# Patient Record
Sex: FEMALE | Race: WHITE | Employment: UNEMPLOYED | URBAN - METROPOLITAN AREA
[De-identification: names, ages, dates, MRNs, and addresses within clinical notes are randomized per-mention and may not be internally consistent; named-entity substitution may affect disease eponyms.]

---

## 2018-03-25 ENCOUNTER — HOSPITAL ENCOUNTER (EMERGENCY)
Age: 53
Discharge: HOME | End: 2018-03-25
Attending: EMERGENCY MEDICINE
Payer: MEDICARE

## 2018-03-25 VITALS
DIASTOLIC BLOOD PRESSURE: 67 MMHG | SYSTOLIC BLOOD PRESSURE: 104 MMHG | TEMPERATURE: 98.6 F | RESPIRATION RATE: 18 BRPM | HEART RATE: 80 BPM | WEIGHT: 240 LBS | HEIGHT: 64 IN | OXYGEN SATURATION: 92 % | BODY MASS INDEX: 40.97 KG/M2

## 2018-03-25 DIAGNOSIS — G40.909 SEIZURE DISORDER (HCC): Primary | ICD-10-CM

## 2018-03-25 DIAGNOSIS — S01.511A LIP LACERATION, INITIAL ENCOUNTER: ICD-10-CM

## 2018-03-25 LAB
EKG ATRIAL RATE: 86 BPM
EKG P AXIS: 49 DEGREES
EKG P-R INTERVAL: 180 MS
EKG Q-T INTERVAL: 378 MS
EKG QRS DURATION: 128 MS
EKG QTC CALCULATION (BAZETT): 452 MS
EKG R AXIS: -14 DEGREES
EKG T AXIS: 11 DEGREES
EKG VENTRICULAR RATE: 86 BPM

## 2018-03-25 PROCEDURE — 93005 ELECTROCARDIOGRAM TRACING: CPT | Performed by: EMERGENCY MEDICINE

## 2018-03-25 PROCEDURE — 99284 EMERGENCY DEPT VISIT MOD MDM: CPT

## 2018-03-25 RX ORDER — ZOLPIDEM TARTRATE 5 MG/1
5 TABLET ORAL NIGHTLY PRN
COMMUNITY
End: 2022-02-15

## 2018-03-25 RX ORDER — QUETIAPINE FUMARATE 200 MG/1
300 TABLET, FILM COATED ORAL 2 TIMES DAILY
COMMUNITY

## 2018-03-25 ASSESSMENT — ENCOUNTER SYMPTOMS
SHORTNESS OF BREATH: 0
BACK PAIN: 0
CHEST TIGHTNESS: 0
SORE THROAT: 0
DIARRHEA: 0
ABDOMINAL PAIN: 0
VOMITING: 0
COUGH: 0
NAUSEA: 0

## 2018-03-25 NOTE — ED PROVIDER NOTES
discharge with outpatient follow-up. The plan has been discussed in detail and they are aware of the specific conditions for emergent return, as well as the importance of follow-up. Discharge Medication List as of 3/25/2018 12:14 PM          Diagnosis:  1. Seizure disorder ([facility] [address]    2. Lip laceration, initial encounter        Disposition:  Patient's disposition: Discharge to home  Patient's condition is stable.        Bibi Martinez, DO  Resident  03/25/18 [id number]

## 2018-05-15 ENCOUNTER — HOSPITAL ENCOUNTER (EMERGENCY)
Age: 53
Discharge: HOME | End: 2018-05-15
Attending: EMERGENCY MEDICINE
Payer: MEDICARE

## 2018-05-15 VITALS
HEIGHT: 62 IN | BODY MASS INDEX: 39.01 KG/M2 | TEMPERATURE: 97.4 F | RESPIRATION RATE: 18 BRPM | HEART RATE: 98 BPM | SYSTOLIC BLOOD PRESSURE: 102 MMHG | OXYGEN SATURATION: 97 % | WEIGHT: 212 LBS | DIASTOLIC BLOOD PRESSURE: 69 MMHG

## 2018-05-15 DIAGNOSIS — S01.01XA LACERATION OF SCALP, INITIAL ENCOUNTER: Primary | ICD-10-CM

## 2018-05-15 PROCEDURE — 6360000002 HC RX W HCPCS: Performed by: EMERGENCY MEDICINE

## 2018-05-15 PROCEDURE — 12001 RPR S/N/AX/GEN/TRNK 2.5CM/<: CPT

## 2018-05-15 PROCEDURE — 99283 EMERGENCY DEPT VISIT LOW MDM: CPT

## 2018-05-15 PROCEDURE — 90715 TDAP VACCINE 7 YRS/> IM: CPT | Performed by: EMERGENCY MEDICINE

## 2018-05-15 PROCEDURE — 90471 IMMUNIZATION ADMIN: CPT | Performed by: EMERGENCY MEDICINE

## 2018-05-15 RX ADMIN — TETANUS TOXOID, REDUCED DIPHTHERIA TOXOID AND ACELLULAR PERTUSSIS VACCINE, ADSORBED 0.5 ML: 5; 2.5; 8; 8; 2.5 SUSPENSION INTRAMUSCULAR at 09:10

## 2018-05-15 ASSESSMENT — ENCOUNTER SYMPTOMS
NAUSEA: 0
BLOOD IN STOOL: 0
ABDOMINAL PAIN: 0
COUGH: 0
VOMITING: 0
BACK PAIN: 0
SHORTNESS OF BREATH: 0

## 2018-05-15 ASSESSMENT — PAIN DESCRIPTION - ONSET: ONSET: SUDDEN

## 2018-05-15 ASSESSMENT — PAIN DESCRIPTION - ORIENTATION: ORIENTATION: POSTERIOR

## 2018-05-15 ASSESSMENT — PAIN SCALES - GENERAL: PAINLEVEL_OUTOF10: 7

## 2018-05-15 ASSESSMENT — PAIN DESCRIPTION - FREQUENCY: FREQUENCY: CONTINUOUS

## 2018-05-15 ASSESSMENT — PAIN DESCRIPTION - LOCATION: LOCATION: HEAD

## 2018-05-15 ASSESSMENT — PAIN DESCRIPTION - PAIN TYPE: TYPE: ACUTE PAIN

## 2018-05-15 ASSESSMENT — PAIN SCALES - WONG BAKER: WONGBAKER_NUMERICALRESPONSE: 6

## 2018-05-15 ASSESSMENT — PAIN DESCRIPTION - DESCRIPTORS: DESCRIPTORS: SORE

## 2018-05-22 ENCOUNTER — HOSPITAL ENCOUNTER (EMERGENCY)
Age: 53
Discharge: HOME | End: 2018-05-22
Payer: MEDICARE

## 2018-05-22 VITALS
SYSTOLIC BLOOD PRESSURE: 96 MMHG | DIASTOLIC BLOOD PRESSURE: 62 MMHG | WEIGHT: 210 LBS | HEART RATE: 95 BPM | OXYGEN SATURATION: 97 % | TEMPERATURE: 98.3 F | BODY MASS INDEX: 37.21 KG/M2 | RESPIRATION RATE: 16 BRPM | HEIGHT: 63 IN

## 2018-05-22 DIAGNOSIS — Z48.02 ENCOUNTER FOR STAPLE REMOVAL: Primary | ICD-10-CM

## 2018-05-22 PROCEDURE — 99281 EMR DPT VST MAYX REQ PHY/QHP: CPT

## 2018-06-24 ENCOUNTER — HOSPITAL ENCOUNTER (EMERGENCY)
Age: 53
Discharge: HOME | End: 2018-06-24
Attending: EMERGENCY MEDICINE
Payer: MEDICARE

## 2018-06-24 ENCOUNTER — APPOINTMENT (OUTPATIENT)
Dept: GENERAL RADIOLOGY | Age: 53
End: 2018-06-24
Payer: MEDICARE

## 2018-06-24 VITALS
BODY MASS INDEX: 42.33 KG/M2 | DIASTOLIC BLOOD PRESSURE: 64 MMHG | HEART RATE: 86 BPM | SYSTOLIC BLOOD PRESSURE: 127 MMHG | RESPIRATION RATE: 14 BRPM | HEIGHT: 62 IN | OXYGEN SATURATION: 94 % | WEIGHT: 230 LBS | TEMPERATURE: 97.8 F

## 2018-06-24 DIAGNOSIS — R56.9 SEIZURE (HCC): Primary | ICD-10-CM

## 2018-06-24 LAB
ALBUMIN SERPL-MCNC: 3.8 G/DL (ref 3.5–5.2)
ALP BLD-CCNC: 75 U/L (ref 35–104)
ALT SERPL-CCNC: 17 U/L (ref 0–32)
ANION GAP SERPL CALCULATED.3IONS-SCNC: 15 MMOL/L (ref 7–16)
AST SERPL-CCNC: 17 U/L (ref 0–31)
BASOPHILS ABSOLUTE: 0.04 E9/L (ref 0–0.2)
BASOPHILS RELATIVE PERCENT: 0.4 % (ref 0–2)
BILIRUB SERPL-MCNC: <0.2 MG/DL (ref 0–1.2)
BILIRUBIN URINE: NEGATIVE
BLOOD, URINE: NEGATIVE
BUN BLDV-MCNC: 15 MG/DL (ref 6–20)
CALCIUM SERPL-MCNC: 9.1 MG/DL (ref 8.6–10.2)
CHLORIDE BLD-SCNC: 101 MMOL/L (ref 98–107)
CHP ED QC CHECK: NORMAL
CLARITY: CLEAR
CO2: 24 MMOL/L (ref 22–29)
COLOR: NORMAL
CREAT SERPL-MCNC: 0.6 MG/DL (ref 0.5–1)
EOSINOPHILS ABSOLUTE: 0.13 E9/L (ref 0.05–0.5)
EOSINOPHILS RELATIVE PERCENT: 1.4 % (ref 0–6)
GFR AFRICAN AMERICAN: >60
GFR NON-AFRICAN AMERICAN: >60 ML/MIN/1.73
GLUCOSE BLD-MCNC: 91 MG/DL
GLUCOSE BLD-MCNC: 94 MG/DL (ref 74–109)
GLUCOSE URINE: NEGATIVE MG/DL
HCT VFR BLD CALC: 39.8 % (ref 34–48)
HEMOGLOBIN: 12.9 G/DL (ref 11.5–15.5)
IMMATURE GRANULOCYTES #: 0.13 E9/L
IMMATURE GRANULOCYTES %: 1.4 % (ref 0–5)
KETONES, URINE: NEGATIVE MG/DL
LACTIC ACID: 2.3 MMOL/L (ref 0.5–2.2)
LEUKOCYTE ESTERASE, URINE: NEGATIVE
LIPASE: 57 U/L (ref 13–60)
LYMPHOCYTES ABSOLUTE: 3.69 E9/L (ref 1.5–4)
LYMPHOCYTES RELATIVE PERCENT: 38.7 % (ref 20–42)
MCH RBC QN AUTO: 31.5 PG (ref 26–35)
MCHC RBC AUTO-ENTMCNC: 32.4 % (ref 32–34.5)
MCV RBC AUTO: 97.1 FL (ref 80–99.9)
METER GLUCOSE: 91 MG/DL (ref 70–110)
MONOCYTES ABSOLUTE: 0.69 E9/L (ref 0.1–0.95)
MONOCYTES RELATIVE PERCENT: 7.2 % (ref 2–12)
NEUTROPHILS ABSOLUTE: 4.85 E9/L (ref 1.8–7.3)
NEUTROPHILS RELATIVE PERCENT: 50.9 % (ref 43–80)
NITRITE, URINE: NEGATIVE
PDW BLD-RTO: 14.9 FL (ref 11.5–15)
PH UA: 7 (ref 5–9)
PLATELET # BLD: 254 E9/L (ref 130–450)
PMV BLD AUTO: 9.7 FL (ref 7–12)
POTASSIUM SERPL-SCNC: 4.7 MMOL/L (ref 3.5–5)
PROTEIN UA: NEGATIVE MG/DL
RBC # BLD: 4.1 E12/L (ref 3.5–5.5)
SODIUM BLD-SCNC: 140 MMOL/L (ref 132–146)
SPECIFIC GRAVITY UA: 1.01 (ref 1–1.03)
TOTAL PROTEIN: 7.2 G/DL (ref 6.4–8.3)
UROBILINOGEN, URINE: 0.2 E.U./DL
VALPROIC ACID LEVEL: 41 MCG/ML (ref 50–100)
WBC # BLD: 9.5 E9/L (ref 4.5–11.5)

## 2018-06-24 PROCEDURE — 81003 URINALYSIS AUTO W/O SCOPE: CPT

## 2018-06-24 PROCEDURE — 80053 COMPREHEN METABOLIC PANEL: CPT

## 2018-06-24 PROCEDURE — 83605 ASSAY OF LACTIC ACID: CPT

## 2018-06-24 PROCEDURE — 71046 X-RAY EXAM CHEST 2 VIEWS: CPT

## 2018-06-24 PROCEDURE — 80164 ASSAY DIPROPYLACETIC ACD TOT: CPT

## 2018-06-24 PROCEDURE — 83690 ASSAY OF LIPASE: CPT

## 2018-06-24 PROCEDURE — 99285 EMERGENCY DEPT VISIT HI MDM: CPT

## 2018-06-24 PROCEDURE — 82962 GLUCOSE BLOOD TEST: CPT

## 2018-06-24 PROCEDURE — 85025 COMPLETE CBC W/AUTO DIFF WBC: CPT

## 2018-06-24 RX ORDER — VALPROIC ACID 250 MG/1
250 CAPSULE, LIQUID FILLED ORAL ONCE
Status: DISCONTINUED | OUTPATIENT
Start: 2018-06-24 | End: 2018-06-24

## 2018-06-24 ASSESSMENT — ENCOUNTER SYMPTOMS
CHEST TIGHTNESS: 0
WHEEZING: 0
ABDOMINAL PAIN: 0
BACK PAIN: 0
VOMITING: 0
NAUSEA: 0
CONSTIPATION: 0
COUGH: 0
DIARRHEA: 0
SHORTNESS OF BREATH: 0
RHINORRHEA: 0
SORE THROAT: 0

## 2018-06-25 ENCOUNTER — APPOINTMENT (OUTPATIENT)
Dept: CT IMAGING | Age: 53
End: 2018-06-25
Payer: MEDICARE

## 2018-06-25 ENCOUNTER — APPOINTMENT (OUTPATIENT)
Dept: GENERAL RADIOLOGY | Age: 53
End: 2018-06-25
Payer: MEDICARE

## 2018-06-25 ENCOUNTER — HOSPITAL ENCOUNTER (EMERGENCY)
Age: 53
Discharge: HOME | End: 2018-06-25
Attending: EMERGENCY MEDICINE
Payer: MEDICARE

## 2018-06-25 VITALS
DIASTOLIC BLOOD PRESSURE: 63 MMHG | HEART RATE: 86 BPM | OXYGEN SATURATION: 92 % | RESPIRATION RATE: 16 BRPM | WEIGHT: 230 LBS | BODY MASS INDEX: 39.27 KG/M2 | SYSTOLIC BLOOD PRESSURE: 113 MMHG | HEIGHT: 64 IN | TEMPERATURE: 97.4 F

## 2018-06-25 DIAGNOSIS — Z87.898 HISTORY OF SEIZURE: ICD-10-CM
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
## 2018-06-25 DIAGNOSIS — S00.83XA FACIAL CONTUSION, INITIAL ENCOUNTER: ICD-10-CM

## 2018-06-25 LAB
ALBUMIN SERPL-MCNC: 3.8 G/DL (ref 3.5–5.2)
ALP BLD-CCNC: 76 U/L (ref 35–104)
ALT SERPL-CCNC: 19 U/L (ref 0–32)
ANION GAP SERPL CALCULATED.3IONS-SCNC: 14 MMOL/L (ref 7–16)
AST SERPL-CCNC: 21 U/L (ref 0–31)
BACTERIA: ABNORMAL /HPF
BASOPHILS ABSOLUTE: 0.03 E9/L (ref 0–0.2)
BASOPHILS RELATIVE PERCENT: 0.4 % (ref 0–2)
BILIRUB SERPL-MCNC: <0.2 MG/DL (ref 0–1.2)
BILIRUBIN URINE: NEGATIVE
BLOOD, URINE: NEGATIVE
BUN BLDV-MCNC: 21 MG/DL (ref 6–20)
CALCIUM SERPL-MCNC: 9.3 MG/DL (ref 8.6–10.2)
CHLORIDE BLD-SCNC: 103 MMOL/L (ref 98–107)
CHP ED QC CHECK: NORMAL
CLARITY: CLEAR
CO2: 26 MMOL/L (ref 22–29)
COLOR: YELLOW
CREAT SERPL-MCNC: 0.9 MG/DL (ref 0.5–1)
EKG ATRIAL RATE: 94 BPM
EKG P AXIS: 32 DEGREES
EKG P-R INTERVAL: 172 MS
EKG Q-T INTERVAL: 388 MS
EKG QRS DURATION: 130 MS
EKG QTC CALCULATION (BAZETT): 485 MS
EKG R AXIS: 7 DEGREES
EKG T AXIS: -17 DEGREES
EKG VENTRICULAR RATE: 94 BPM
EOSINOPHILS ABSOLUTE: 0.09 E9/L (ref 0.05–0.5)
EOSINOPHILS RELATIVE PERCENT: 1.1 % (ref 0–6)
EPITHELIAL CELLS, UA: ABNORMAL /HPF
GFR AFRICAN AMERICAN: >60
GFR NON-AFRICAN AMERICAN: >60 ML/MIN/1.73
GLUCOSE BLD-MCNC: 97 MG/DL
GLUCOSE BLD-MCNC: 97 MG/DL (ref 74–109)
GLUCOSE URINE: NEGATIVE MG/DL
HCT VFR BLD CALC: 38.5 % (ref 34–48)
HEMOGLOBIN: 12.6 G/DL (ref 11.5–15.5)
IMMATURE GRANULOCYTES #: 0.07 E9/L
IMMATURE GRANULOCYTES %: 0.8 % (ref 0–5)
KETONES, URINE: ABNORMAL MG/DL
LACTIC ACID: 2.1 MMOL/L (ref 0.5–2.2)
LEUKOCYTE ESTERASE, URINE: ABNORMAL
LYMPHOCYTES ABSOLUTE: 3.1 E9/L (ref 1.5–4)
LYMPHOCYTES RELATIVE PERCENT: 36.3 % (ref 20–42)
MCH RBC QN AUTO: 32 PG (ref 26–35)
MCHC RBC AUTO-ENTMCNC: 32.7 % (ref 32–34.5)
MCV RBC AUTO: 97.7 FL (ref 80–99.9)
MONOCYTES ABSOLUTE: 0.74 E9/L (ref 0.1–0.95)
MONOCYTES RELATIVE PERCENT: 8.7 % (ref 2–12)
NEUTROPHILS ABSOLUTE: 4.51 E9/L (ref 1.8–7.3)
NEUTROPHILS RELATIVE PERCENT: 52.7 % (ref 43–80)
NITRITE, URINE: NEGATIVE
PDW BLD-RTO: 15 FL (ref 11.5–15)
PH UA: 7.5 (ref 5–9)
PLATELET # BLD: 240 E9/L (ref 130–450)
PMV BLD AUTO: 9.7 FL (ref 7–12)
POTASSIUM SERPL-SCNC: 4.6 MMOL/L (ref 3.5–5)
PROTEIN UA: NEGATIVE MG/DL
RBC # BLD: 3.94 E12/L (ref 3.5–5.5)
RBC UA: ABNORMAL /HPF (ref 0–2)
SODIUM BLD-SCNC: 143 MMOL/L (ref 132–146)
SPECIFIC GRAVITY UA: 1.01 (ref 1–1.03)
TOTAL CK: 54 U/L (ref 20–180)
TOTAL PROTEIN: 7 G/DL (ref 6.4–8.3)
TROPONIN: <0.01 NG/ML (ref 0–0.03)
UROBILINOGEN, URINE: 0.2 E.U./DL
VALPROIC ACID LEVEL: 48 MCG/ML (ref 50–100)
WBC # BLD: 8.5 E9/L (ref 4.5–11.5)
WBC UA: ABNORMAL /HPF (ref 0–5)

## 2018-06-25 PROCEDURE — 80164 ASSAY DIPROPYLACETIC ACD TOT: CPT

## 2018-06-25 PROCEDURE — 80177 DRUG SCRN QUAN LEVETIRACETAM: CPT

## 2018-06-25 PROCEDURE — 81001 URINALYSIS AUTO W/SCOPE: CPT

## 2018-06-25 PROCEDURE — 2580000003 HC RX 258: Performed by: EMERGENCY MEDICINE

## 2018-06-25 PROCEDURE — 83605 ASSAY OF LACTIC ACID: CPT

## 2018-06-25 PROCEDURE — 71045 X-RAY EXAM CHEST 1 VIEW: CPT

## 2018-06-25 PROCEDURE — 70486 CT MAXILLOFACIAL W/O DYE: CPT

## 2018-06-25 PROCEDURE — 70450 CT HEAD/BRAIN W/O DYE: CPT

## 2018-06-25 PROCEDURE — 82550 ASSAY OF CK (CPK): CPT

## 2018-06-25 PROCEDURE — 84484 ASSAY OF TROPONIN QUANT: CPT

## 2018-06-25 PROCEDURE — 80053 COMPREHEN METABOLIC PANEL: CPT

## 2018-06-25 PROCEDURE — 85025 COMPLETE CBC W/AUTO DIFF WBC: CPT

## 2018-06-25 PROCEDURE — 93005 ELECTROCARDIOGRAM TRACING: CPT | Performed by: EMERGENCY MEDICINE

## 2018-06-25 PROCEDURE — 99284 EMERGENCY DEPT VISIT MOD MDM: CPT

## 2018-06-25 PROCEDURE — 72125 CT NECK SPINE W/O DYE: CPT

## 2018-06-25 RX ORDER — 0.9 % SODIUM CHLORIDE 0.9 %
500 INTRAVENOUS SOLUTION INTRAVENOUS ONCE
Status: COMPLETED | OUTPATIENT
Start: 2018-06-25 | End: 2018-06-25

## 2018-06-25 RX ADMIN — SODIUM CHLORIDE 500 ML: 9 INJECTION, SOLUTION INTRAVENOUS at 08:27

## 2018-06-27 LAB — KEPPRA: 42 UG/ML (ref 12–46)

## 2022-02-01 LAB — VITAMIN B-12: 555 PG/ML (ref 247–911)

## 2022-02-04 ENCOUNTER — PREP FOR PROCEDURE (OUTPATIENT)
Dept: DENTISTRY | Age: 57
End: 2022-02-04

## 2022-02-04 RX ORDER — SODIUM CHLORIDE 0.9 % (FLUSH) 0.9 %
10 SYRINGE (ML) INJECTION PRN
Status: CANCELLED | OUTPATIENT
Start: 2022-02-04

## 2022-02-04 RX ORDER — SODIUM CHLORIDE 0.9 % (FLUSH) 0.9 %
10 SYRINGE (ML) INJECTION EVERY 12 HOURS SCHEDULED
Status: CANCELLED | OUTPATIENT
Start: 2022-02-04

## 2022-02-04 RX ORDER — SODIUM CHLORIDE 9 MG/ML
25 INJECTION, SOLUTION INTRAVENOUS PRN
Status: CANCELLED | OUTPATIENT
Start: 2022-02-04

## 2022-02-04 RX ORDER — SODIUM CHLORIDE, SODIUM LACTATE, POTASSIUM CHLORIDE, CALCIUM CHLORIDE 600; 310; 30; 20 MG/100ML; MG/100ML; MG/100ML; MG/100ML
INJECTION, SOLUTION INTRAVENOUS CONTINUOUS
Status: CANCELLED | OUTPATIENT
Start: 2022-02-04

## 2022-02-15 LAB
ABSOLUTE BASO #: 0 10*3/UL (ref 0–0.1)
ABSOLUTE EOS #: 0.2 10*3/UL (ref 0–0.4)
ABSOLUTE NEUT #: 4.1 10*3/UL (ref 2.3–7.9)
ALBUMIN: 2.9 GM/DL (ref 3.1–4.5)
ALP BLD-CCNC: 100 U/L (ref 45–117)
ALT SERPL-CCNC: 35 U/L (ref 12–78)
AST SERPL-CCNC: 26 IU/L (ref 3–35)
BASOPHILS %: 0.4 % (ref 0–1)
BILIRUB SERPL-MCNC: 0.1 MG/DL (ref 0.2–1)
BUN BLDV-MCNC: 16 MG/DL (ref 7–24)
CALCIUM SERPL-MCNC: 9 MG/DL (ref 8.5–10.5)
CHLORIDE BLD-SCNC: 106 MMOL/L (ref 98–107)
CHOLESTEROL: 194 MG/DL
CO2: 28 MMOL/L (ref 21–32)
CREAT SERPL-MCNC: 0.6 MG/DL (ref 0.55–1.02)
EOSINOPHILS %: 2.1 % (ref 1–4)
FOLIC ACID, SERUM: 13.19 NG/ML
GFR AFRICAN AMERICAN: > 60 ML/MIN
GFR SERPL CREATININE-BSD FRML MDRD: >60 ML/MIN/
GLUCOSE: 93 MG/DL (ref 65–99)
HCT VFR BLD CALC: 41.6 % (ref 37–47)
HDLC SERPL-MCNC: 38 MG/DL (ref 40–60)
HEMOGLOBIN: 13.4 G/DL (ref 12–16)
IMMATURE GRANULOCYTES #: 0.1 10*3/UL (ref 0–0.1)
IMMATURE GRANULOCYTES: 0.6 % (ref 0–1)
LDL CHOLESTEROL: ABNORMAL MG/DL (ref 9–159)
LYMPHOCYTE %: 39.9 % (ref 27–41)
LYMPHOCYTES # BLD: 3.3 10*3/UL (ref 1.3–4.4)
MCH RBC QN AUTO: 31.2 PG (ref 27–31)
MCHC RBC AUTO-ENTMCNC: 32.2 G/DL (ref 33–37)
MCV RBC AUTO: 96.7 FL (ref 81–99)
MONOCYTES # BLD: 0.6 10*3/UL (ref 0.1–1)
MONOCYTES %: 7.1 % (ref 3–9)
NEUTROPHILS %: 49.9 % (ref 47–73)
NUCLEATED RED BLOOD CELLS: 0 % (ref 0–0)
PDW BLD-RTO: 15 % (ref 0–14.5)
PLATELET # BLD: 218 10*3/UL (ref 130–400)
PMV BLD AUTO: 10.1 FL (ref 9.6–12.3)
POTASSIUM SERPL-SCNC: 4.1 MMOL/L (ref 3.5–5.1)
RBC # BLD: 4.3 10*6/UL (ref 4.1–5.1)
SODIUM BLD-SCNC: 140 MMOL/L (ref 136–145)
T3 UPTAKE: 34 % (ref 31–39)
T4 TOTAL: 5 UG/DL (ref 4.8–13.9)
T7 FREE THYROXINE INDEX: 1.7 (ref 1.5–5.4)
TOTAL PROTEIN: 7.1 GM/DL (ref 6.4–8.2)
TRIGL SERPL-MCNC: 447 MG/DL
TSH SERPL DL<=0.05 MIU/L-ACNC: 1.56 UIU/ML (ref 0.36–4.75)
URIC ACID: 3.6 MG/DL (ref 2.6–6)
VITAMIN D 25-HYDROXY: 34.9 NG/ML (ref 30–100)
VLDLC SERPL CALC-MCNC: ABNORMAL MG/DL (ref 6–40)
WBC # BLD: 8.2 10*3/UL (ref 4.8–10.8)

## 2022-02-15 RX ORDER — SODIUM CHLORIDE, SODIUM LACTATE, POTASSIUM CHLORIDE, CALCIUM CHLORIDE 600; 310; 30; 20 MG/100ML; MG/100ML; MG/100ML; MG/100ML
INJECTION, SOLUTION INTRAVENOUS CONTINUOUS
Status: CANCELLED | OUTPATIENT
Start: 2022-02-15

## 2022-02-15 RX ORDER — SODIUM CHLORIDE 0.9 % (FLUSH) 0.9 %
10 SYRINGE (ML) INJECTION PRN
Status: CANCELLED | OUTPATIENT
Start: 2022-02-15

## 2022-02-15 RX ORDER — SODIUM CHLORIDE 0.9 % (FLUSH) 0.9 %
10 SYRINGE (ML) INJECTION EVERY 12 HOURS SCHEDULED
Status: CANCELLED | OUTPATIENT
Start: 2022-02-15

## 2022-02-15 RX ORDER — SODIUM CHLORIDE 9 MG/ML
25 INJECTION, SOLUTION INTRAVENOUS PRN
Status: CANCELLED | OUTPATIENT
Start: 2022-02-15

## 2022-02-15 NOTE — H&P
Dental History and Physical    [facility] [address] [address]    The patient is a 49 y.o. female    Chief Complaint: \"Here for regular dental check-up\"     History of present illness: Patient presented to dental clinic for risk and benefit appointment. Due to patient's IDD and mental status patient could not be properly examined or treated in the dental clinic. Patient was then scheduled to undergo her regular dental care under general anesthesia. Past Medical History:      Diagnosis Date    COVID-19 11/2021    Hyperlipidemia     MR (mental retardation), moderate     Seizures ([address]     HAS VNS    Thyroid disease        Past Surgical History:        Procedure Laterality Date    ENDOMETRIAL ABLATION      OTHER SURGICAL HISTORY      VAGAL NERVE STIMULATOR       Medications Prior to Admission:    Prior to Admission medications    Medication Sig Start Date End Date Taking? Authorizing Provider   asenapine maleate (SAPHRIS) 10 MG SUBL sublingual tablet Place 10 mg under the tongue nightly as needed    Historical Provider, MD   cetirizine (ZYRTEC) 10 MG tablet Take 10 mg by mouth daily    Historical Provider, MD   Multiple Vitamin (DAILY TOSHIA) TABS Take by mouth    Historical Provider, MD   docusate sodium (COLACE) 100 MG capsule Take 100 mg by mouth daily    Historical Provider, MD   dextromethorphan-quiNIDine (NUEDEXTA) 20-10 MG CAPS per capsule Take 1 capsule by mouth daily    Historical Provider, MD   nystatin-triamcinolone (MYCOLOG II) 552519-0.2 UNIT/GM-% cream Apply topically 4 times daily Apply topically to ab 4 times daily.     Historical Provider, MD   Omega 3 1200 MG CAPS Take by mouth    Historical Provider, MD   rufinamide (BANZEL) 400 MG tablet Take 400 mg by mouth 2 times daily    Historical Provider, MD   vitamin B-12 (CYANOCOBALAMIN) 1000 MCG tablet Take 1,000 mcg by mouth daily    Historical Provider, MD   vitamin D (ERGOCALCIFEROL) 1.25 MG (93916 UT) CAPS capsule Take 50,000 Units by mouth once a week    Historical Provider, MD   Cenobamate (XCOPRI) 100 MG TABS Take 200 mg by mouth    Historical Provider, MD   acetaminophen (TYLENOL) 325 MG tablet Take 650 mg by mouth every 4 hours as needed for Pain    Historical Provider, MD   QUEtiapine (SEROQUEL) 200 MG tablet Take 300 mg by mouth 2 times daily     Historical Provider, MD   lacosamide (VIMPAT) 200 MG tablet Take 1 tablet by mouth 2 times daily. 5/28/14   BONIFACIO Vogel   levETIRAcetam (KEPPRA) 500 MG tablet Take 3 tablets by mouth 2 times daily. 2/14/14   BONIFACIO Vogel   levothyroxine (SYNTHROID) 50 MCG tablet Take 50 mcg by mouth daily. Historical Provider, MD   atorvastatin (LIPITOR) 40 MG tablet Take 40 mg by mouth daily. Historical Provider, MD   ezetimibe (ZETIA) 10 MG tablet Take 10 mg by mouth daily. Historical Provider, MD   calcium-vitamin D (OSCAL-500) 500-200 MG-UNIT per tablet Take 1 tablet by mouth 2 times daily. Historical Provider, MD   magnesium hydroxide (MILK OF MAGNESIA) 400 MG/5ML suspension Take  by mouth daily as needed. Historical Provider, MD   allopurinol (ZYLOPRIM) 300 MG tablet Take 300 mg by mouth daily. Historical Provider, MD   ARIPiprazole (ABILIFY) 5 MG tablet Take 5 mg by mouth daily. Historical Provider, MD       Allergies:  Ampicillin    Social History:   TOBACCO:   reports that she has never smoked. She has never used smokeless tobacco.  ETOH:   reports no history of alcohol use. OCCUPATION:  Unknown     Family History:   No family history on file.     REVIEW OF SYSTEMS:  Completed by Dr. Mauricio Vasquez 1/29/22    Labs and Imaging Studies   Basic Labs  CBC with Differential:    Lab Results   Component Value Date    WBC 8.2 02/15/2022    RBC 4.30 02/15/2022    HGB 13.4 02/15/2022    HCT 41.6 02/15/2022     02/15/2022    MCV 96.7 02/15/2022    MCH 31.2 02/15/2022    MCHC 32.2 02/15/2022    RDW 15.0 02/15/2022    NRBC 0.0 02/15/2022    SEGSPCT 58 09/16/2012    LYMPHOPCT 39.9 02/15/2022    MONOPCT 0.6 02/15/2022    MONOPCT 8.7 06/25/2018    BASOPCT 0.4 06/25/2018    MONOSABS 0.74 06/25/2018    LYMPHSABS 3.10 06/25/2018    EOSABS 0.2 02/15/2022    EOSABS 0.09 06/25/2018    BASOSABS 0.0 02/15/2022    BASOSABS 0.03 06/25/2018       Imaging Studies:  Radiology:   Currently have 3 bitewing films and a panorex. Further updated images to be obtained in OR. Oral Findings:    Hygiene: mucous membranes moist, pharynx normal without lesions and dental hygiene good    Dentition: poor    Teeth: caries: #14, 15, and 18 caries - further caries TBD in OR    Retained roots unknown     Impactions tooth # unknown     Gingiva: poor    Mucous Membrane: mucous membranes moist, pharynx normal without lesions and dental hygiene poor    Tongue: tongue midline, papillated    Floor of mouth: normal    Alveolar Process: normal    Salivary Glands: normal    Tentative Diagnosis: Dental caries and plaque induced gingivitis    Resident Assessment and Plan:   Radiographs, exam, cleaning, fluoride, and any needed restorations and extractions     Yi Russo DDS  2/15/2022  2:52 PM    Attending physician: Dr. Deanna Gil     I agree with the above.    Electronically signed by Mariana Fields DDS on 2/16/2022 at 1:05 PM

## 2022-02-15 NOTE — H&P (VIEW-ONLY)
Dental History and Physical    Jessie Ariasmer    [address] [id number]    The patient is a 49 y.o. female    Chief Complaint: \"Here for regular dental check-up\"     History of present illness: Patient presented to dental clinic for risk and benefit appointment. Due to patient's IDD and mental status patient could not be properly examined or treated in the dental clinic. Patient was then scheduled to undergo her regular dental care under general anesthesia. Past Medical History:      Diagnosis Date    COVID-19 11/2021    Hyperlipidemia     MR (mental retardation), moderate     Seizures ([address]     HAS VNS    Thyroid disease        Past Surgical History:        Procedure Laterality Date    ENDOMETRIAL ABLATION      OTHER SURGICAL HISTORY      VAGAL NERVE STIMULATOR       Medications Prior to Admission:    Prior to Admission medications    Medication Sig Start Date End Date Taking? Authorizing Provider   asenapine maleate (SAPHRIS) 10 MG SUBL sublingual tablet Place 10 mg under the tongue nightly as needed    Historical Provider, MD   cetirizine (ZYRTEC) 10 MG tablet Take 10 mg by mouth daily    Historical Provider, MD   Multiple Vitamin (DAILY TOSHIA) TABS Take by mouth    Historical Provider, MD   docusate sodium (COLACE) 100 MG capsule Take 100 mg by mouth daily    Historical Provider, MD   dextromethorphan-quiNIDine (NUEDEXTA) 20-10 MG CAPS per capsule Take 1 capsule by mouth daily    Historical Provider, MD   nystatin-triamcinolone (MYCOLOG II) 133621-2.9 UNIT/GM-% cream Apply topically 4 times daily Apply topically to ab 4 times daily.     Historical Provider, MD   Omega 3 1200 MG CAPS Take by mouth    Historical Provider, MD   rufinamide (BANZEL) 400 MG tablet Take 400 mg by mouth 2 times daily    Historical Provider, MD   vitamin B-12 (CYANOCOBALAMIN) 1000 MCG tablet Take 1,000 mcg by mouth daily    Historical Provider, MD   vitamin D (ERGOCALCIFEROL) 1.25 MG (00105 UT) CAPS capsule Take 50,000 Units by mouth once a week    Historical Provider, MD   Cenobamate (XCOPRI) 100 MG TABS Take 200 mg by mouth    Historical Provider, MD   acetaminophen (TYLENOL) 325 MG tablet Take 650 mg by mouth every 4 hours as needed for Pain    Historical Provider, MD   QUEtiapine (SEROQUEL) 200 MG tablet Take 300 mg by mouth 2 times daily     Historical Provider, MD   lacosamide (VIMPAT) 200 MG tablet Take 1 tablet by mouth 2 times daily. 5/28/14   BONIFACIO Robles   levETIRAcetam (KEPPRA) 500 MG tablet Take 3 tablets by mouth 2 times daily. 2/14/14   BONIFACIO Robles   levothyroxine (SYNTHROID) 50 MCG tablet Take 50 mcg by mouth daily. Historical Provider, MD   atorvastatin (LIPITOR) 40 MG tablet Take 40 mg by mouth daily. Historical Provider, MD   ezetimibe (ZETIA) 10 MG tablet Take 10 mg by mouth daily. Historical Provider, MD   calcium-vitamin D (OSCAL-500) 500-200 MG-UNIT per tablet Take 1 tablet by mouth 2 times daily. Historical Provider, MD   magnesium hydroxide (MILK OF MAGNESIA) 400 MG/5ML suspension Take  by mouth daily as needed. Historical Provider, MD   allopurinol (ZYLOPRIM) 300 MG tablet Take 300 mg by mouth daily. Historical Provider, MD   ARIPiprazole (ABILIFY) 5 MG tablet Take 5 mg by mouth daily. Historical Provider, MD       Allergies:  Ampicillin    Social History:   TOBACCO:   reports that she has never smoked. She has never used smokeless tobacco.  ETOH:   reports no history of alcohol use. OCCUPATION:  Unknown     Family History:   No family history on file.     REVIEW OF SYSTEMS:  Completed by Dr. Hola Jonas 1/29/22    Labs and Imaging Studies   Basic Labs  CBC with Differential:    Lab Results   Component Value Date    WBC 8.2 02/15/2022    RBC 4.30 02/15/2022    HGB 13.4 02/15/2022    HCT 41.6 02/15/2022     02/15/2022    MCV 96.7 02/15/2022    MCH 31.2 02/15/2022    MCHC 32.2 02/15/2022    RDW 15.0 02/15/2022    NRBC 0.0 02/15/2022    SEGSPCT 58 09/16/2012    LYMPHOPCT 39.9 02/15/2022    MONOPCT 0.6 02/15/2022    MONOPCT 8.7 06/25/2018    BASOPCT 0.4 06/25/2018    MONOSABS 0.74 06/25/2018    LYMPHSABS 3.10 06/25/2018    EOSABS 0.2 02/15/2022    EOSABS 0.09 06/25/2018    BASOSABS 0.0 02/15/2022    BASOSABS 0.03 06/25/2018       Imaging Studies:  Radiology:   Currently have 3 bitewing films and a panorex. Further updated images to be obtained in OR. Oral Findings:    Hygiene: mucous membranes moist, pharynx normal without lesions and dental hygiene good    Dentition: poor    Teeth: caries: #14, 15, and 18 caries - further caries TBD in OR    Retained roots unknown     Impactions tooth # unknown     Gingiva: poor    Mucous Membrane: mucous membranes moist, pharynx normal without lesions and dental hygiene poor    Tongue: tongue midline, papillated    Floor of mouth: normal    Alveolar Process: normal    Salivary Glands: normal    Tentative Diagnosis: Dental caries and plaque induced gingivitis    Resident Assessment and Plan:   Radiographs, exam, cleaning, fluoride, and any needed restorations and extractions     Rosana Watt DDS  2/15/2022  2:52 PM    Attending physician: Dr. Erendira Patterson     I agree with the above.    Electronically signed by Dieter Cao DDS on 2/16/2022 at 1:05 PM

## 2022-02-21 LAB — KEPPRA: 47.1 UG/ML (ref 10–40)

## 2022-02-23 ENCOUNTER — PREP FOR PROCEDURE (OUTPATIENT)
Dept: DENTISTRY | Age: 57
End: 2022-02-23

## 2022-02-23 RX ORDER — SODIUM CHLORIDE 0.9 % (FLUSH) 0.9 %
10 SYRINGE (ML) INJECTION PRN
Status: CANCELLED | OUTPATIENT
Start: 2022-02-23

## 2022-02-23 RX ORDER — SODIUM CHLORIDE 9 MG/ML
25 INJECTION, SOLUTION INTRAVENOUS PRN
Status: CANCELLED | OUTPATIENT
Start: 2022-02-23

## 2022-02-23 RX ORDER — SODIUM CHLORIDE 0.9 % (FLUSH) 0.9 %
10 SYRINGE (ML) INJECTION EVERY 12 HOURS SCHEDULED
Status: CANCELLED | OUTPATIENT
Start: 2022-02-23

## 2022-02-23 RX ORDER — SODIUM CHLORIDE, SODIUM LACTATE, POTASSIUM CHLORIDE, CALCIUM CHLORIDE 600; 310; 30; 20 MG/100ML; MG/100ML; MG/100ML; MG/100ML
INJECTION, SOLUTION INTRAVENOUS CONTINUOUS
Status: CANCELLED | OUTPATIENT
Start: 2022-02-23

## 2022-02-23 NOTE — PROGRESS NOTES
[address] [facility] PRE-ADMISSION TESTING GENERAL INSTRUCTIONS- [facility]-phone number:[phone]    GENERAL INSTRUCTIONS  [x] Antibacterial Soap shower Night before Surgery  [] Hunter wipe instruction sheet and wipes given. [x] Nothing by mouth after midnight, including gum, candy, mints, or water. [x] You may brush your teeth, gargle, but do NOT swallow water. []Hibiclens shower  the night before and the morning of surgery. Do not use             Hibiclens on your face or head. [x]No smoking, chewing tobacco, illegal drugs, or alcohol within 24 hours of your surgery. [x] Jewelry, valuables or body piercing's should not be brought to the hospital. All body and/or tongue piercing's must be removed prior to arriving to hospital.  ALL hair pins must be removed. [x] Do not wear makeup, lotions, powders, deodorant. Nail polish as directed by the nurse. [x] Arrange transportation with a responsible adult  to and from the hospital. If you do not have a responsible adult  to transport you, you will need to make arrangements with a medical transportation company (i.e. [employer]. A Uber/taxi/bus is not appropriate unless you are accompanied by a responsible adult ). Arrange for someone to be with you for the remainder of the day and for 24 hours after your procedure due to having had anesthesia. Who will be your  for transportation? [facility]   Who will be staying with you for 24 hrs after your procedure?__________________  [x] Bring insurance card and photo ID.  [] Transfusion Bracelet: Please bring with you to hospital, day of surgery  [] Bring urine specimen day of surgery. Any small container is acceptable. [] Use inhalers the morning of surgery and bring with you to hospital.  [x] Bring copy of living will or healthcare power of  papers to be placed in your electronic record.   [] CPAP/BI-PAP: Please bring your machine if you are to spend the night in the hospital.     PARKING INSTRUCTIONS:   [x] Arrival Time:  2/25 at 6;15 am.  One person may accompany you. Please wear a mask. · [x] Parking lot '\"I\"  is located on [facility] (the corner of [facility] and [facility] [address] [facility]). To enter, press the button and the gate will lift. A free token will be provided to exit the lot. One car per patient is allowed to park in this lot. All other cars are to park on [address] [place] [address] either in the parking garage or the handicap lot. [] To reach the [facility] lobby from [address] [place] [address], upon entering the hospital, take elevator B to the 3rd floor. EDUCATION INSTRUCTIONS:      [] Knee or hip replacement booklet & exercise pamphlets given. [] [address] placed in chart. [] Pre-admission Testing educational folder given  [] Incentive Spirometry,coughing & deep breathing exercises reviewed. []Medication information sheet(s)   []Fluoroscopy-Xray used in surgery reviewed with patient. Educational pamphlet placed in chart. []Pain: Post-op pain is normal and to be expected. You will be asked to rate your pain from 0-10(a zero is not acceptable-education is needed). Your post-op pain goal is:  [] Ask your nurse for your pain medication. [] Joint camp offered. [] Joint replacement booklets given. [] Other:___________________________    MEDICATION INSTRUCTIONS:   [x]Bring a complete list of your medications, please write the last time you took the medicine, give this list to the nurse. [x] Take the following medications the morning of surgery with 1-2 ounces of water: None   [x] Stop herbal supplements and vitamins 5 days before your surgery. [] DO NOT take any diabetic medicine the morning of surgery. Follow instructions for insulin the day before surgery. [] If you are diabetic and your blood sugar is low or you feel symptomatic, you may drink 1-2 ounces of apple juice or take a glucose tablet.   The morning of your procedure, you may call the pre-op area if you have concerns about your blood sugar [phone]. [] Use your inhalers the morning of surgery. Bring your emergency inhaler with you day of surgery. [x] Follow physician instructions regarding any blood thinners you may be taking. WHAT TO EXPECT:  [x] The day of surgery you will be greeted and checked in by the [employer].  In addition, you will be registered in the [facility] by a Patient Access Representative. Please bring your photo ID and insurance card. A nurse will greet you in accordance to the time you are needed in the pre-op area to prepare you for surgery. Please do not be discouraged if you are not greeted in the order you arrive as there are many variables that are involved in patient preparation. Your patience is greatly appreciated as you wait for your nurse. Please bring in items such as: books, magazines, newspapers, electronics, or any other items  to occupy your time in the waiting area. [x]  Delays may occur with surgery and staff will make a sincere effort to keep you informed of delays. If any delays occur with your procedure, we apologize ahead of time for your inconvenience as we recognize the value of your time.

## 2022-02-24 ENCOUNTER — ANESTHESIA EVENT (OUTPATIENT)
Dept: OPERATING ROOM | Age: 57
End: 2022-02-24
Payer: MEDICARE

## 2022-02-25 ENCOUNTER — HOSPITAL ENCOUNTER (OUTPATIENT)
Age: 57
Setting detail: OUTPATIENT SURGERY
Discharge: HOME | End: 2022-02-25
Attending: DENTIST | Admitting: DENTIST
Payer: MEDICARE

## 2022-02-25 ENCOUNTER — ANESTHESIA (OUTPATIENT)
Dept: OPERATING ROOM | Age: 57
End: 2022-02-25
Payer: MEDICARE

## 2022-02-25 VITALS
DIASTOLIC BLOOD PRESSURE: 78 MMHG | RESPIRATION RATE: 21 BRPM | SYSTOLIC BLOOD PRESSURE: 96 MMHG | OXYGEN SATURATION: 87 %

## 2022-02-25 VITALS
RESPIRATION RATE: 21 BRPM | HEIGHT: 61 IN | BODY MASS INDEX: 39.08 KG/M2 | HEART RATE: 82 BPM | SYSTOLIC BLOOD PRESSURE: 104 MMHG | TEMPERATURE: 97.6 F | WEIGHT: 207 LBS | OXYGEN SATURATION: 97 % | DIASTOLIC BLOOD PRESSURE: 61 MMHG

## 2022-02-25 PROBLEM — K02.9 DENTAL CARIES: Chronic | Status: ACTIVE | Noted: 2022-02-25

## 2022-02-25 PROBLEM — K05.6 PERIODONTAL DISEASE: Chronic | Status: ACTIVE | Noted: 2022-02-25

## 2022-02-25 PROCEDURE — 7100000001 HC PACU RECOVERY - ADDTL 15 MIN: Performed by: DENTIST

## 2022-02-25 PROCEDURE — 3700000001 HC ADD 15 MINUTES (ANESTHESIA): Performed by: DENTIST

## 2022-02-25 PROCEDURE — 6360000002 HC RX W HCPCS

## 2022-02-25 PROCEDURE — 6360000002 HC RX W HCPCS: Performed by: ANESTHESIOLOGY

## 2022-02-25 PROCEDURE — 2500000003 HC RX 250 WO HCPCS: Performed by: NURSE ANESTHETIST, CERTIFIED REGISTERED

## 2022-02-25 PROCEDURE — 6360000002 HC RX W HCPCS: Performed by: NURSE ANESTHETIST, CERTIFIED REGISTERED

## 2022-02-25 PROCEDURE — 2709999900 HC NON-CHARGEABLE SUPPLY: Performed by: DENTIST

## 2022-02-25 PROCEDURE — 7100000000 HC PACU RECOVERY - FIRST 15 MIN: Performed by: DENTIST

## 2022-02-25 PROCEDURE — 3600000003 HC SURGERY LEVEL 3 BASE: Performed by: DENTIST

## 2022-02-25 PROCEDURE — 7100000011 HC PHASE II RECOVERY - ADDTL 15 MIN: Performed by: DENTIST

## 2022-02-25 PROCEDURE — 6370000000 HC RX 637 (ALT 250 FOR IP): Performed by: DENTIST

## 2022-02-25 PROCEDURE — 3700000000 HC ANESTHESIA ATTENDED CARE: Performed by: DENTIST

## 2022-02-25 PROCEDURE — 2580000003 HC RX 258

## 2022-02-25 PROCEDURE — 3600000013 HC SURGERY LEVEL 3 ADDTL 15MIN: Performed by: DENTIST

## 2022-02-25 PROCEDURE — 7100000010 HC PHASE II RECOVERY - FIRST 15 MIN: Performed by: DENTIST

## 2022-02-25 PROCEDURE — 2500000003 HC RX 250 WO HCPCS: Performed by: DENTIST

## 2022-02-25 PROCEDURE — 2500000003 HC RX 250 WO HCPCS

## 2022-02-25 RX ORDER — ROCURONIUM BROMIDE 10 MG/ML
INJECTION, SOLUTION INTRAVENOUS PRN
Status: DISCONTINUED | OUTPATIENT
Start: 2022-02-25 | End: 2022-02-25 | Stop reason: SDUPTHER

## 2022-02-25 RX ORDER — PROPOFOL 10 MG/ML
INJECTION, EMULSION INTRAVENOUS PRN
Status: DISCONTINUED | OUTPATIENT
Start: 2022-02-25 | End: 2022-02-25 | Stop reason: SDUPTHER

## 2022-02-25 RX ORDER — SODIUM CHLORIDE, SODIUM LACTATE, POTASSIUM CHLORIDE, CALCIUM CHLORIDE 600; 310; 30; 20 MG/100ML; MG/100ML; MG/100ML; MG/100ML
INJECTION, SOLUTION INTRAVENOUS CONTINUOUS
Status: DISCONTINUED | OUTPATIENT
Start: 2022-02-25 | End: 2022-02-25 | Stop reason: HOSPADM

## 2022-02-25 RX ORDER — SODIUM CHLORIDE 9 MG/ML
25 INJECTION, SOLUTION INTRAVENOUS PRN
Status: DISCONTINUED | OUTPATIENT
Start: 2022-02-25 | End: 2022-02-25 | Stop reason: HOSPADM

## 2022-02-25 RX ORDER — PHENYLEPHRINE HCL IN 0.9% NACL 1 MG/10 ML
SYRINGE (ML) INTRAVENOUS PRN
Status: DISCONTINUED | OUTPATIENT
Start: 2022-02-25 | End: 2022-02-25 | Stop reason: SDUPTHER

## 2022-02-25 RX ORDER — SODIUM CHLORIDE 0.9 % (FLUSH) 0.9 %
5-40 SYRINGE (ML) INJECTION EVERY 12 HOURS SCHEDULED
Status: DISCONTINUED | OUTPATIENT
Start: 2022-02-25 | End: 2022-02-25 | Stop reason: HOSPADM

## 2022-02-25 RX ORDER — NEOSTIGMINE METHYLSULFATE 1 MG/ML
INJECTION, SOLUTION INTRAVENOUS PRN
Status: DISCONTINUED | OUTPATIENT
Start: 2022-02-25 | End: 2022-02-25 | Stop reason: SDUPTHER

## 2022-02-25 RX ORDER — DEXAMETHASONE SODIUM PHOSPHATE 10 MG/ML
INJECTION INTRAMUSCULAR; INTRAVENOUS PRN
Status: DISCONTINUED | OUTPATIENT
Start: 2022-02-25 | End: 2022-02-25 | Stop reason: SDUPTHER

## 2022-02-25 RX ORDER — SODIUM CHLORIDE 0.9 % (FLUSH) 0.9 %
10 SYRINGE (ML) INJECTION EVERY 12 HOURS SCHEDULED
Status: DISCONTINUED | OUTPATIENT
Start: 2022-02-25 | End: 2022-02-25 | Stop reason: HOSPADM

## 2022-02-25 RX ORDER — FENTANYL CITRATE 50 UG/ML
INJECTION, SOLUTION INTRAMUSCULAR; INTRAVENOUS PRN
Status: DISCONTINUED | OUTPATIENT
Start: 2022-02-25 | End: 2022-02-25 | Stop reason: SDUPTHER

## 2022-02-25 RX ORDER — SODIUM CHLORIDE 9 MG/ML
INJECTION, SOLUTION INTRAVENOUS CONTINUOUS PRN
Status: DISCONTINUED | OUTPATIENT
Start: 2022-02-25 | End: 2022-02-25 | Stop reason: SDUPTHER

## 2022-02-25 RX ORDER — MEPERIDINE HYDROCHLORIDE 25 MG/ML
12.5 INJECTION INTRAMUSCULAR; INTRAVENOUS; SUBCUTANEOUS EVERY 5 MIN PRN
Status: DISCONTINUED | OUTPATIENT
Start: 2022-02-25 | End: 2022-02-25 | Stop reason: HOSPADM

## 2022-02-25 RX ORDER — GLYCOPYRROLATE 1 MG/5 ML
SYRINGE (ML) INTRAVENOUS PRN
Status: DISCONTINUED | OUTPATIENT
Start: 2022-02-25 | End: 2022-02-25 | Stop reason: SDUPTHER

## 2022-02-25 RX ORDER — SODIUM CHLORIDE 0.9 % (FLUSH) 0.9 %
5-40 SYRINGE (ML) INJECTION PRN
Status: DISCONTINUED | OUTPATIENT
Start: 2022-02-25 | End: 2022-02-25 | Stop reason: HOSPADM

## 2022-02-25 RX ORDER — LIDOCAINE HYDROCHLORIDE 20 MG/ML
INJECTION, SOLUTION INTRAVENOUS PRN
Status: DISCONTINUED | OUTPATIENT
Start: 2022-02-25 | End: 2022-02-25 | Stop reason: SDUPTHER

## 2022-02-25 RX ORDER — IBUPROFEN 600 MG/1
600 TABLET ORAL EVERY 6 HOURS PRN
Qty: 20 TABLET | Refills: 0 | Status: SHIPPED | OUTPATIENT
Start: 2022-02-25 | End: 2022-03-02

## 2022-02-25 RX ORDER — PROPOFOL 10 MG/ML
INJECTION, EMULSION INTRAVENOUS CONTINUOUS PRN
Status: DISCONTINUED | OUTPATIENT
Start: 2022-02-25 | End: 2022-02-25 | Stop reason: SDUPTHER

## 2022-02-25 RX ORDER — MORPHINE SULFATE 2 MG/ML
2 INJECTION, SOLUTION INTRAMUSCULAR; INTRAVENOUS EVERY 5 MIN PRN
Status: DISCONTINUED | OUTPATIENT
Start: 2022-02-25 | End: 2022-02-25 | Stop reason: HOSPADM

## 2022-02-25 RX ORDER — SODIUM CHLORIDE 0.9 % (FLUSH) 0.9 %
10 SYRINGE (ML) INJECTION PRN
Status: DISCONTINUED | OUTPATIENT
Start: 2022-02-25 | End: 2022-02-25 | Stop reason: HOSPADM

## 2022-02-25 RX ORDER — ALBUTEROL SULFATE 2.5 MG/3ML
2.5 SOLUTION RESPIRATORY (INHALATION) ONCE
Status: COMPLETED | OUTPATIENT
Start: 2022-02-25 | End: 2022-02-25

## 2022-02-25 RX ORDER — LIDOCAINE HYDROCHLORIDE AND EPINEPHRINE BITARTRATE 20; .01 MG/ML; MG/ML
INJECTION, SOLUTION SUBCUTANEOUS PRN
Status: DISCONTINUED | OUTPATIENT
Start: 2022-02-25 | End: 2022-02-25 | Stop reason: ALTCHOICE

## 2022-02-25 RX ORDER — ONDANSETRON 2 MG/ML
INJECTION INTRAMUSCULAR; INTRAVENOUS PRN
Status: DISCONTINUED | OUTPATIENT
Start: 2022-02-25 | End: 2022-02-25 | Stop reason: SDUPTHER

## 2022-02-25 RX ORDER — MIDAZOLAM HYDROCHLORIDE 1 MG/ML
INJECTION INTRAMUSCULAR; INTRAVENOUS PRN
Status: DISCONTINUED | OUTPATIENT
Start: 2022-02-25 | End: 2022-02-25 | Stop reason: SDUPTHER

## 2022-02-25 RX ORDER — MORPHINE SULFATE 2 MG/ML
1 INJECTION, SOLUTION INTRAMUSCULAR; INTRAVENOUS EVERY 5 MIN PRN
Status: DISCONTINUED | OUTPATIENT
Start: 2022-02-25 | End: 2022-02-25 | Stop reason: HOSPADM

## 2022-02-25 RX ADMIN — LIDOCAINE HYDROCHLORIDE 60 MG: 20 INJECTION, SOLUTION INTRAVENOUS at 07:49

## 2022-02-25 RX ADMIN — MIDAZOLAM 1 MG: 1 INJECTION INTRAMUSCULAR; INTRAVENOUS at 07:35

## 2022-02-25 RX ADMIN — ALBUTEROL SULFATE 2.5 MG: 2.5 SOLUTION RESPIRATORY (INHALATION) at 09:59

## 2022-02-25 RX ADMIN — Medication 0.6 MG: at 09:30

## 2022-02-25 RX ADMIN — Medication 100 MCG: at 08:31

## 2022-02-25 RX ADMIN — DEXAMETHASONE SODIUM PHOSPHATE 10 MG: 10 INJECTION INTRAMUSCULAR; INTRAVENOUS at 07:55

## 2022-02-25 RX ADMIN — FENTANYL CITRATE 50 MCG: 50 INJECTION, SOLUTION INTRAMUSCULAR; INTRAVENOUS at 07:49

## 2022-02-25 RX ADMIN — SODIUM CHLORIDE: 9 INJECTION, SOLUTION INTRAVENOUS at 07:33

## 2022-02-25 RX ADMIN — ONDANSETRON 4 MG: 2 INJECTION INTRAMUSCULAR; INTRAVENOUS at 09:24

## 2022-02-25 RX ADMIN — Medication 150 MCG: at 08:24

## 2022-02-25 RX ADMIN — Medication 3 MG: at 09:30

## 2022-02-25 RX ADMIN — PROPOFOL 150 MG: 10 INJECTION, EMULSION INTRAVENOUS at 07:49

## 2022-02-25 RX ADMIN — PROPOFOL 75 MCG/KG/MIN: 10 INJECTION, EMULSION INTRAVENOUS at 07:59

## 2022-02-25 RX ADMIN — ROCURONIUM BROMIDE 10 MG: 10 SOLUTION INTRAVENOUS at 07:49

## 2022-02-25 RX ADMIN — ROCURONIUM BROMIDE 40 MG: 10 SOLUTION INTRAVENOUS at 07:51

## 2022-02-25 RX ADMIN — Medication 0.2 MG: at 08:36

## 2022-02-25 ASSESSMENT — PAIN SCALES - PAIN ASSESSMENT IN ADVANCED DEMENTIA (PAINAD)
FACIALEXPRESSION: 0
NEGVOCALIZATION: 0
TOTALSCORE: 0
BREATHING: 0
CONSOLABILITY: 0
BODYLANGUAGE: 0
TOTALSCORE: 0
BREATHING: 0
CONSOLABILITY: 0
FACIALEXPRESSION: 0
NEGVOCALIZATION: 0
BODYLANGUAGE: 0

## 2022-02-25 ASSESSMENT — PULMONARY FUNCTION TESTS
PIF_VALUE: 33
PIF_VALUE: 33
PIF_VALUE: 34
PIF_VALUE: 21
PIF_VALUE: 11
PIF_VALUE: 32
PIF_VALUE: 3
PIF_VALUE: 32
PIF_VALUE: 32
PIF_VALUE: 17
PIF_VALUE: 30
PIF_VALUE: 32
PIF_VALUE: 33
PIF_VALUE: 32
PIF_VALUE: 3
PIF_VALUE: 32
PIF_VALUE: 32
PIF_VALUE: 1
PIF_VALUE: 32
PIF_VALUE: 36
PIF_VALUE: 35
PIF_VALUE: 13
PIF_VALUE: 36
PIF_VALUE: 4
PIF_VALUE: 33
PIF_VALUE: 32
PIF_VALUE: 13
PIF_VALUE: 36
PIF_VALUE: 36
PIF_VALUE: 32
PIF_VALUE: 1
PIF_VALUE: 33
PIF_VALUE: 13
PIF_VALUE: 32
PIF_VALUE: 33
PIF_VALUE: 31
PIF_VALUE: 18
PIF_VALUE: 33
PIF_VALUE: 36
PIF_VALUE: 35
PIF_VALUE: 32
PIF_VALUE: 17
PIF_VALUE: 35
PIF_VALUE: 32
PIF_VALUE: 26
PIF_VALUE: 32
PIF_VALUE: 26
PIF_VALUE: 16
PIF_VALUE: 17
PIF_VALUE: 33
PIF_VALUE: 33
PIF_VALUE: 36
PIF_VALUE: 16
PIF_VALUE: 14
PIF_VALUE: 32
PIF_VALUE: 33
PIF_VALUE: 32
PIF_VALUE: 33
PIF_VALUE: 32
PIF_VALUE: 24
PIF_VALUE: 32
PIF_VALUE: 15
PIF_VALUE: 32
PIF_VALUE: 36
PIF_VALUE: 32
PIF_VALUE: 32
PIF_VALUE: 27
PIF_VALUE: 32
PIF_VALUE: 21
PIF_VALUE: 32
PIF_VALUE: 33
PIF_VALUE: 32
PIF_VALUE: 32
PIF_VALUE: 33
PIF_VALUE: 33
PIF_VALUE: 21
PIF_VALUE: 1
PIF_VALUE: 32
PIF_VALUE: 32
PIF_VALUE: 33
PIF_VALUE: 33
PIF_VALUE: 1
PIF_VALUE: 33
PIF_VALUE: 33
PIF_VALUE: 21
PIF_VALUE: 33
PIF_VALUE: 13
PIF_VALUE: 32
PIF_VALUE: 31
PIF_VALUE: 32
PIF_VALUE: 32
PIF_VALUE: 18
PIF_VALUE: 33
PIF_VALUE: 32
PIF_VALUE: 33
PIF_VALUE: 33
PIF_VALUE: 34
PIF_VALUE: 32
PIF_VALUE: 0
PIF_VALUE: 33
PIF_VALUE: 33
PIF_VALUE: 32
PIF_VALUE: 0
PIF_VALUE: 32
PIF_VALUE: 33
PIF_VALUE: 33

## 2022-02-25 ASSESSMENT — PAIN - FUNCTIONAL ASSESSMENT: PAIN_FUNCTIONAL_ASSESSMENT: 0-10

## 2022-02-25 ASSESSMENT — PAIN SCALES - GENERAL: PAINLEVEL_OUTOF10: 0

## 2022-02-25 NOTE — ANESTHESIA PRE PROCEDURE
Department of Anesthesiology  Preprocedure Note       Name:  Aundrea Tucker   Age:  49 y.o.  :  1965                                          MRN:  [id number]         Date:  2022      Surgeon: Rosemary Dennis):  Alejandrina Henao DDS    Procedure: Procedure(s):  DENTAL RESTORATIONS ( FACILITY)    Medications prior to admission:   Prior to Admission medications    Medication Sig Start Date End Date Taking? Authorizing Provider   asenapine maleate (SAPHRIS) 10 MG SUBL sublingual tablet Place 10 mg under the tongue nightly as needed   Yes Historical Provider, MD   cetirizine (ZYRTEC) 10 MG tablet Take 10 mg by mouth daily   Yes Historical Provider, MD   Multiple Vitamin (DAILY TOSHIA) TABS Take by mouth   Yes Historical Provider, MD   docusate sodium (COLACE) 100 MG capsule Take 100 mg by mouth daily   Yes Historical Provider, MD   dextromethorphan-quiNIDine (NUEDEXTA) 20-10 MG CAPS per capsule Take 1 capsule by mouth daily   Yes Historical Provider, MD   nystatin-triamcinolone (MYCOLOG II) 858244-4.1 UNIT/GM-% cream Apply topically 4 times daily Apply topically to ab 4 times daily. Yes Historical Provider, MD   Omega 3 1200 MG CAPS Take by mouth   Yes Historical Provider, MD   rufinamide (BANZEL) 400 MG tablet Take 400 mg by mouth 2 times daily   Yes Historical Provider, MD   vitamin B-12 (CYANOCOBALAMIN) 1000 MCG tablet Take 1,000 mcg by mouth daily   Yes Historical Provider, MD   vitamin D (ERGOCALCIFEROL) 1.25 MG (16238 UT) CAPS capsule Take 50,000 Units by mouth once a week   Yes Historical Provider, MD   Cenobamate (XCOPRI) 100 MG TABS Take 200 mg by mouth   Yes Historical Provider, MD   acetaminophen (TYLENOL) 325 MG tablet Take 650 mg by mouth every 4 hours as needed for Pain   Yes Historical Provider, MD   QUEtiapine (SEROQUEL) 200 MG tablet Take 300 mg by mouth 2 times daily    Yes Historical Provider, MD   lacosamide (VIMPAT) 200 MG tablet Take 1 tablet by mouth 2 times daily.  14  Yes Enid Newton. BONIFACIO Denton - CNS   levETIRAcetam (KEPPRA) 500 MG tablet Take 3 tablets by mouth 2 times daily. 2/14/14  Yes BONIFACIO Frey - CNS   levothyroxine (SYNTHROID) 50 MCG tablet Take 50 mcg by mouth daily. Yes Historical Provider, MD   atorvastatin (LIPITOR) 40 MG tablet Take 40 mg by mouth daily. Yes Historical Provider, MD   ezetimibe (ZETIA) 10 MG tablet Take 10 mg by mouth daily. Yes Historical Provider, MD   calcium-vitamin D (OSCAL-500) 500-200 MG-UNIT per tablet Take 1 tablet by mouth 2 times daily. Yes Historical Provider, MD   magnesium hydroxide (MILK OF MAGNESIA) 400 MG/5ML suspension Take  by mouth daily as needed. Yes Historical Provider, MD   allopurinol (ZYLOPRIM) 300 MG tablet Take 300 mg by mouth daily. Yes Historical Provider, MD   ARIPiprazole (ABILIFY) 5 MG tablet Take 5 mg by mouth daily. Yes Historical Provider, MD       Current medications:    Current Facility-Administered Medications   Medication Dose Route Frequency Provider Last Rate Last Admin    0.9 % sodium chloride infusion  25 mL IntraVENous PRN [place] Criselda, DDS        lactated ringers infusion   IntraVENous Continuous [place] Criselda, DDS        sodium chloride flush 0.9 % injection 10 mL  10 mL IntraVENous 2 times per day [place] Criselda, DDS        sodium chloride flush 0.9 % injection 10 mL  10 mL IntraVENous PRN [place] Criselda, DDS           Allergies:     Allergies   Allergen Reactions    Ampicillin        Problem List:    Patient Active Problem List   Diagnosis Code    Seizure ([facility] [address] R56.9       Past Medical History:        Diagnosis Date    COVID-19 11/2021    Hyperlipidemia     MR (mental retardation), moderate     Seizures ([facility] [address]     HAS VNS    Thyroid disease        Past Surgical History:        Procedure Laterality Date    ENDOMETRIAL ABLATION      OTHER SURGICAL HISTORY      VAGAL NERVE STIMULATOR       Social History:    Social History     Tobacco Use    Smoking status: Never Smoker    Smokeless tobacco: Never Used   Substance Use Topics    Alcohol use: No                                Counseling given: Not Answered      Vital Signs (Current):   Vitals:    02/23/22 1613 02/25/22 0650   BP:  100/63   Pulse:  88   Resp:  20   Temp:  36.4 °C (97.6 °F)   SpO2:  93%   Weight: 207 lb (93.9 kg) 207 lb (93.9 kg)   Height: 5' 1\" (1.549 m) 5' 1\" (1.549 m)                                              BP Readings from Last 3 Encounters:   02/25/22 100/63   06/25/18 113/63   06/24/18 127/64       NPO Status:  > 8 hours                                                                               BMI:   Wt Readings from Last 3 Encounters:   02/25/22 207 lb (93.9 kg)   06/25/18 230 lb (104.3 kg)   06/24/18 230 lb (104.3 kg)     Body mass index is 39.11 kg/m². CBC:   Lab Results   Component Value Date    WBC 8.2 02/15/2022    RBC 4.30 02/15/2022    HGB 13.4 02/15/2022    HCT 41.6 02/15/2022    MCV 96.7 02/15/2022    RDW 15.0 02/15/2022     02/15/2022       CMP:   Lab Results   Component Value Date     02/15/2022    K 4.1 02/15/2022     02/15/2022    CO2 28 02/15/2022    BUN 16 02/15/2022    CREATININE 0.60 02/15/2022    GFRAA > 60 02/15/2022    LABGLOM >60 02/15/2022    GLUCOSE 93 02/15/2022    PROT 7.1 02/15/2022    CALCIUM 9.0 02/15/2022    BILITOT 0.1 02/15/2022    BILITOT NEGATIVE 07/31/2020    ALKPHOS 100 02/15/2022    AST 26 02/15/2022    ALT 35 02/15/2022       POC Tests: No results for input(s): POCGLU, POCNA, POCK, POCCL, POCBUN, POCHEMO, POCHCT in the last 72 hours.     Coags: No results found for: PROTIME, INR, APTT    HCG (If Applicable):   Lab Results   Component Value Date    PREGTESTUR NEGATIVE 09/16/2012        ABGs: No results found for: PHART, PO2ART, KMP1URF, OPD5STV, BEART, R6UDEXQK     Type & Screen (If Applicable):  No results found for: LABABO, LABRH    Drug/Infectious Status (If Applicable):  No results found for: HIV, HEPCAB    COVID-19 Screening (If Applicable):   Lab Results   Component Value Date    COVID19 Detected 10/29/2021           Anesthesia Evaluation  Patient summary reviewed and Nursing notes reviewed no history of anesthetic complications:   Airway: Mallampati: III  TM distance: <3 FB   Neck ROM: limited  Mouth opening: < 3 FB Dental:          Pulmonary:Negative Pulmonary ROS breath sounds clear to auscultation                             Cardiovascular:    (+) hyperlipidemia                  Neuro/Psych:   (+) seizures:, psychiatric history ( MR):            GI/Hepatic/Renal: Neg GI/Hepatic/Renal ROS            Endo/Other:    (+) hypothyroidism::., .                 Abdominal:   (+) obese,           Vascular: negative vascular ROS. Other Findings:             Anesthesia Plan      general     ASA 3       Induction: intravenous. MIPS: Postoperative opioids intended and Prophylactic antiemetics administered. Anesthetic plan and risks discussed with patient (Spoke with Eber Gutierrez. Supervisor of [facility]. ). Use of blood products discussed with patient whom consented to blood products. Plan discussed with CRNA and attending. Ziggy Montero RN   2/25/2022      Pt seen, examined, chart reviewed, plan discussed.   Michael Alba MD  2/25/2022  8:07 AM

## 2022-02-25 NOTE — OP NOTE
Operative Note      Patient: Cher Hernandez  YOB: 1965  MRN: [id number]    Date of Procedure: 2/25/2022     Surgeon: ANNELISE Camarillo DDS    Surgical Wound Classification: Clean Contaminated II    Preoperative Diagnosis: Dental Caries     Postoperative Diagnosis: Dental Caries, Generalized Severe Chronic Periodontitis, Supraerupted #15    Operation: Exam, Prophy, Fluoride Treatment, Restorative: 2 Extractions: 1    Anesthesia: General, ETT    Estimated Blood Loss: Minimal     Complications:  N/A    Fluids: 800 mL    Specimen: N/A    Conditions:  good    Disposition: To PACU, stable    Procedure: This patient was initially seen in the preoperative holding area, where the history, physical and consents were updated and verified. The patient was then transferred via the anesthesia team and [facility] to operating room # 10 at [address] [place] [address] on 2/25/2022 , at which time the patient was transferred from the [facility] onto the operating room table and placed in the supine position. The patient's arms and legs were padded at the sides. The patient had the general anesthetic monitors applied. Please see anesthesia notes for complete details. Once the patient was placed into a general anesthetic state, the surgical area was prepped and draped in a standard oral and maxillofacial surgery fashion. A throat pack was placed. A comprehensive oral exam was performed. 4 Bitewings and 14 Periapical radiographs were taken. A treatment plan was formulated based upon presenting clinical and radiographic findings. Caries were identified, followed by the preparation of the following teeth: #14 -O and #31 - DL. Dental restorations were placed as follows: #14 -O and #31 - DL. Dental restorations were polished and refined to proper occlusion. Generalized chronic moderate-severe periodontitis present. Class l mobility: #9,20,22,23,24,25,27.  Class ll mobility: #7. Full mouth scaling and root planing completed with hand instruments only due to VNS. Surgical procedure was initiated. Using 1.5 length 27-gauge needle, and 1.7 mL of 3% Carbocaine, was administered. Utilizing proper surgical instruments and techniques, the following teeth were removed: #15. Teeth were removed due to caries supported by clinical and radiographic evidence. #15 supraerupted and non-functional. Large probe depths in this area. All teeth removed were non-restorable. Extraction sites were copiously irrigated with normal saline, and felt to be smooth by finger touch. Gel foam placed in socket. Extractions sites were closed with 3-0 Chromic Gut Suture on a tapered PS-2 needle. Hemostasis achieved. One final round of copious irrigation with suction was completed. Throat pack was removed. Patient was awake from anesthesia. Patient was released to recovery room in good condition. Patient tolerated procedure well. Postoperative instructions given to caregiver. The following prescriptions were given: (1) Motrin 600 mg . No refills on prescription. 1-Week Post Op:  3/4/2022 at  1:00 PM     Written by: Lauryn Short DDS , D.D.S. for Veronika Byrnes DDS    I was present with the above resident and patient for pre-operative, procedure, and post-operative care. I agree with the above. Written and verbal post-op instructions given to patient's caregiver. All questions addressed.    Electronically signed by Veronika Byrnes DDS on 2/25/2022 at 10:24 AM

## 2022-02-25 NOTE — ANESTHESIA POSTPROCEDURE EVALUATION
Department of Anesthesiology  Postprocedure Note    Patient: Gibran An  MRN: [id number]  YOB: 1965  Date of evaluation: 2/25/2022  Time:  10:44 AM     Procedure Summary     Date: 02/25/22 Room / Location: [facility] OR 10 / [facility]    Anesthesia Start: 3321 Anesthesia Stop:     Procedure: DENTAL RESTORATIONS ( FACILITY) (N/A Mouth) Diagnosis: (DENTAL CARIES)    Surgeons: Rebecca Sellers DDS Responsible Provider: Danna Estrada MD    Anesthesia Type: general ASA Status: 3          Anesthesia Type: general    Sabino Phase I: Sabino Score: 8    Sabino Phase II:      Last vitals: Reviewed and per EMR flowsheets.        Anesthesia Post Evaluation    Patient location during evaluation: PACU  Patient participation: complete - patient participated  Level of consciousness: awake  Pain score: 3  Airway patency: patent  Nausea & Vomiting: no nausea and no vomiting  Complications: no  Cardiovascular status: blood pressure returned to baseline  Respiratory status: acceptable  Hydration status: euvolemic

## 2022-02-25 NOTE — ANESTHESIA POSTPROCEDURE EVALUATION
Department of Anesthesiology  Postprocedure Note    Patient: Vincent Garcia  MRN: [id number]  YOB: 1965  Date of evaluation: 2/25/2022  Time:  10:46 AM     Procedure Summary     Date: 02/25/22 Room / Location: [place] [id number] / [facility]    Anesthesia Start: 3201 Anesthesia Stop: 5819    Procedure: EXAM, RADIOGRAPHS, ADULT PROPHYLAXIS, FLUORIDE, RESTORATIONS, EXTRACTION x1 (N/A Mouth) Diagnosis: (DENTAL CARIES)    Surgeons: Karsten Nick DDS Responsible Provider: Andrew Smith MD    Anesthesia Type: general ASA Status: 3          Anesthesia Type: general    Sabino Phase I: Sabino Score: 8    Sabino Phase II:      Last vitals: Reviewed and per EMR flowsheets.        Anesthesia Post Evaluation

## 2022-02-25 NOTE — BRIEF OP NOTE
Brief Postoperative Note      Patient: Loulou Nelson  YOB: 1965  MRN: [id number]    Date of Procedure: 2/25/2022    Pre-Op Diagnosis: DENTAL CARIES    Post-Op Diagnosis: Dental Caries, Generalized Severe Periodontitis, Supraeruption of #15       Procedure(s):  EXAM, RADIOGRAPHS, ADULT PROPHYLAXIS, FLUORIDE, RESTORATIONS, EXTRACTIONS    Surgeon(s):  Callum Loving [address] [place] [address], ANNELISE    Assistant:  ANTWON An    Anesthesia: General, ETT    Estimated Blood Loss (mL): Minimal    Complications: None    Specimens:   * No specimens in log *    Implants:  * No implants in log *      Drains: * No LDAs found *    Findings: Generalized Severe Chronic Periodontitis, Dental Caries, Supraerupted #15    Electronically signed by Romana Willett DDS on 2/25/2022 at 9:41 AM    I agree with the above.    Electronically signed by Callum Loving DDS on 2/25/2022 at 10:19 AM

## 2022-02-25 NOTE — INTERVAL H&P NOTE
Update History & Physical    The patient's History and Physical of January 29, 2022 was reviewed with the patient and her caregiver and I examined the patient. There was no change. The surgical site was confirmed by the patient and me. Plan: The risks, benefits, expected outcome, and alternative to the recommended procedure have been discussed with the patient. Patient understands and wants to proceed with the procedure. Consents obtained from guardian.      Electronically signed by Deep Fermin DDS on 2/25/2022 at 7:22 AM

## 2022-02-25 NOTE — PROGRESS NOTES
Admitted to pre op from [facility] . Staff here to assist patient. Staff Leidy Barakat has paper work and states patient has a left chest implant to help control seizures. She wears a bracelet on her right wrist.   The bracelet is to swipe the chest implant on onset of the seizure and every one minute up to 5 minutes. The patient should be taken to the hospital for further treatment after 5 minutes. The staff state her seizure usually only last about 1-2 minutes. She tends to fall and drop to the ground with seizures and has a blank stare. She is currently cooperative and pleasant.

## 2022-02-25 NOTE — PROGRESS NOTES
Nursing home called and updated med list and time.     anethesia and dental sighned paper work   Patient to surgery

## 2022-07-05 LAB — SARS-COV-2, NAA: NOT DETECTED

## 2022-07-08 LAB — SARS-COV-2, NAA: NOT DETECTED

## 2022-07-11 LAB — SARS-COV-2, NAA: NOT DETECTED

## 2022-07-13 LAB — SARS-COV-2, NAA: NOT DETECTED

## 2022-08-23 LAB
ABSOLUTE BASO #: 0 10*3/UL (ref 0–0.1)
ABSOLUTE EOS #: 0.2 10*3/UL (ref 0–0.4)
ABSOLUTE NEUT #: 3.5 10*3/UL (ref 2.3–7.9)
ALBUMIN: 2.9 GM/DL (ref 3.1–4.5)
ALP BLD-CCNC: 49 U/L (ref 45–117)
ALT SERPL-CCNC: 24 U/L (ref 12–78)
AST SERPL-CCNC: 19 IU/L (ref 3–35)
BASOPHILS %: 0.5 % (ref 0–1)
BILIRUB SERPL-MCNC: 0.1 MG/DL (ref 0.2–1)
BUN BLDV-MCNC: 21 MG/DL (ref 7–24)
CALCIUM SERPL-MCNC: 9.1 MG/DL (ref 8.5–10.5)
CHLORIDE BLD-SCNC: 107 MMOL/L (ref 98–107)
CHOLESTEROL: 251 MG/DL
CO2: 27 MMOL/L (ref 21–32)
CREAT SERPL-MCNC: 0.71 MG/DL (ref 0.55–1.02)
EOSINOPHILS %: 2 % (ref 1–4)
FOLIC ACID, SERUM: 17.08 NG/ML
GFR AFRICAN AMERICAN: > 60 ML/MIN
GFR SERPL CREATININE-BSD FRML MDRD: >60 ML/MIN/
GLUCOSE: 87 MG/DL (ref 65–99)
HCT VFR BLD CALC: 40.6 % (ref 37–47)
HDLC SERPL-MCNC: 48 MG/DL (ref 40–60)
HEMOGLOBIN: 13.2 G/DL (ref 12–16)
IMMATURE GRANULOCYTES #: 0 10*3/UL (ref 0–0.1)
IMMATURE GRANULOCYTES: 0.5 % (ref 0–1)
LDL CHOLESTEROL: 140 MG/DL (ref 9–159)
LYMPHOCYTE %: 44.4 % (ref 27–41)
LYMPHOCYTES # BLD: 3.4 10*3/UL (ref 1.3–4.4)
MCH RBC QN AUTO: 31 PG (ref 27–31)
MCHC RBC AUTO-ENTMCNC: 32.5 G/DL (ref 33–37)
MCV RBC AUTO: 95.3 FL (ref 81–99)
MONOCYTES # BLD: 0.6 10*3/UL (ref 0.1–1)
MONOCYTES %: 7.3 % (ref 3–9)
NEUTROPHILS %: 45.3 % (ref 47–73)
NUCLEATED RED BLOOD CELLS: 0 % (ref 0–0)
PDW BLD-RTO: 14.2 % (ref 0–14.5)
PLATELET # BLD: 267 10*3/UL (ref 130–400)
PMV BLD AUTO: 10.3 FL (ref 9.6–12.3)
POTASSIUM SERPL-SCNC: 4.1 MMOL/L (ref 3.5–5.1)
RBC # BLD: 4.26 10*6/UL (ref 4.1–5.1)
SODIUM BLD-SCNC: 141 MMOL/L (ref 136–145)
T3 UPTAKE: 32 % (ref 31–39)
T4 TOTAL: 5.8 UG/DL (ref 4.8–13.9)
T7 FREE THYROXINE INDEX: 1.8 (ref 1.5–5.4)
TOTAL PROTEIN: 7.1 GM/DL (ref 6.4–8.2)
TRIGL SERPL-MCNC: 317 MG/DL
TSH SERPL DL<=0.05 MIU/L-ACNC: 2.02 UIU/ML (ref 0.36–4.75)
URIC ACID: 3.5 MG/DL (ref 2.6–6)
VITAMIN B-12: 615 PG/ML (ref 247–911)
VITAMIN D 25-HYDROXY: 40.2 NG/ML (ref 30–100)
VLDLC SERPL CALC-MCNC: 63 MG/DL (ref 6–40)
WBC # BLD: 7.7 10*3/UL (ref 4.8–10.8)

## 2022-08-25 LAB — KEPPRA: 40.3 UG/ML (ref 10–40)

## 2022-12-13 LAB
ABSOLUTE BASO #: 0 10*3/UL (ref 0–0.1)
ABSOLUTE EOS #: 0.1 10*3/UL (ref 0–0.4)
ABSOLUTE NEUT #: 3.8 10*3/UL (ref 2.3–7.9)
ALBUMIN: 3.3 GM/DL (ref 3.4–5)
ALP BLD-CCNC: 47 U/L (ref 46–116)
ALT SERPL-CCNC: 19 U/L (ref 10–49)
APTT: 26.5 SECONDS (ref 20–32.1)
AST SERPL-CCNC: 24 IU/L (ref 0–34)
BACTERIA, URINE: ABNORMAL
BASOPHILS %: 0.4 % (ref 0–1)
BILIRUB SERPL-MCNC: 0.2 MG/DL (ref 0.3–1.2)
BILIRUBIN: NEGATIVE
BLOOD: NEGATIVE
BUN BLDV-MCNC: 17 MG/DL (ref 9–23)
CALCIUM OXALATE CRYSTALS: ABNORMAL
CALCIUM SERPL-MCNC: 9.2 MD/DL (ref 8.7–10.4)
CHLORIDE BLD-SCNC: 106 MMOL/L (ref 98–107)
CLARITY: ABNORMAL
CO2: 27 MMOL/L (ref 20–31)
COLOR: YELLOW
COMMENT: YES
CREAT SERPL-MCNC: 0.65 MG/DL (ref 0.55–1.02)
EOSINOPHILS %: 1.7 % (ref 1–4)
EPITHELIAL CELLS, UA: ABNORMAL
ESTIMATED AVERAGE GLUCOSE: 103
GFR AFRICAN AMERICAN: > 60 ML/MIN
GFR SERPL CREATININE-BSD FRML MDRD: >60 ML/MIN/
GLUCOSE: 84 MG/DL (ref 65–99)
GLUCOSE: NEGATIVE
HBA1C MFR BLD: 5.2 % (ref 4.8–5.6)
HCT VFR BLD CALC: 40.2 % (ref 37–47)
HEMOGLOBIN: 13 G/DL (ref 12–16)
IMMATURE GRANULOCYTES #: 0.1 10*3/UL (ref 0–0.1)
IMMATURE GRANULOCYTES: 0.9 % (ref 0–1)
INR BLD: 1 (ref 2–3.5)
KETONES: NEGATIVE
LEUKOCYTE ESTERASE, URINE: NEGATIVE
LYMPHOCYTE %: 42.6 % (ref 27–41)
LYMPHOCYTES # BLD: 3.5 10*3/UL (ref 1.3–4.4)
MCH RBC QN AUTO: 31.6 PG (ref 27–31)
MCHC RBC AUTO-ENTMCNC: 32.3 G/DL (ref 33–37)
MCV RBC AUTO: 97.6 FL (ref 81–99)
MONOCYTES # BLD: 0.6 10*3/UL (ref 0.1–1)
MONOCYTES %: 6.9 % (ref 3–9)
NEUTROPHILS %: 47.5 % (ref 47–73)
NITRITE, URINE: NEGATIVE
NUCLEATED RED BLOOD CELLS: 0 % (ref 0–0)
PDW BLD-RTO: 14.5 % (ref 0–14.5)
PH, URINE: 6.5 (ref 4.5–8)
PLATELET # BLD: 256 10*3/UL (ref 130–400)
PMV BLD AUTO: 10.5 FL (ref 9.6–12.3)
POTASSIUM SERPL-SCNC: 4.3 MMOL/L (ref 3.4–5.1)
PROTEIN UA: NEGATIVE
PROTHROMBIN TIME: 10.6 SECONDS (ref 8.9–12.2)
RBC # BLD: 4.12 10*6/UL (ref 4.1–5.1)
RBC UA: ABNORMAL RBC/HPF (ref 0–2)
SODIUM BLD-SCNC: 141 MMOL/L (ref 136–145)
SPECIFIC GRAVITY UA: 1.02 (ref 1–1.03)
TOTAL PROTEIN: 6.8 GM/DL (ref 6–8)
UROBILINOGEN, URINE: 0.2 E.U./DL (ref 0–1)
WBC # BLD: 8.1 10*3/UL (ref 4.8–10.8)
WBC URINE: ABNORMAL WBC/HPF (ref 0–5)

## 2022-12-15 LAB
MRSA SCREEN: ABNORMAL
URINE CULTURE, ROUTINE: ABNORMAL

## 2023-01-26 ENCOUNTER — TELEPHONE (OUTPATIENT)
Dept: FAMILY MEDICINE CLINIC | Age: 58
End: 2023-01-26

## 2023-02-14 LAB
ABSOLUTE BASO #: 0 10*3/UL (ref 0–0.1)
ABSOLUTE EOS #: 0.2 10*3/UL (ref 0–0.4)
ABSOLUTE NEUT #: 4.1 10*3/UL (ref 2.3–7.9)
ALBUMIN: 3.2 GM/DL (ref 3.4–5)
ALP BLD-CCNC: 49 U/L (ref 46–116)
ALT SERPL-CCNC: 23 U/L (ref 10–49)
AST SERPL-CCNC: 24 IU/L (ref 0–34)
BASOPHILS %: 0.4 % (ref 0–1)
BILIRUB SERPL-MCNC: 0.2 MG/DL (ref 0.3–1.2)
BUN BLDV-MCNC: 16 MG/DL (ref 9–23)
CALCIUM SERPL-MCNC: 9.1 MD/DL (ref 8.7–10.4)
CHLORIDE BLD-SCNC: 103 MMOL/L (ref 98–107)
CHOLESTEROL: 228 MG/DL
CO2: 28 MMOL/L (ref 20–31)
CREAT SERPL-MCNC: 0.72 MG/DL (ref 0.55–1.02)
EOSINOPHILS %: 2.3 % (ref 1–4)
FOLIC ACID, SERUM: 18.25 NG/ML (ref 5.38–24)
GFR AFRICAN AMERICAN: > 60 ML/MIN
GFR SERPL CREATININE-BSD FRML MDRD: >60 ML/MIN/
GLUCOSE: 89 MG/DL (ref 65–99)
HCT VFR BLD CALC: 41 % (ref 37–47)
HDLC SERPL-MCNC: 43 MG/DL (ref 40–60)
HEMOGLOBIN: 12.9 G/DL (ref 12–16)
IMMATURE GRANULOCYTES #: 0 10*3/UL (ref 0–0.1)
IMMATURE GRANULOCYTES: 0.4 % (ref 0–1)
KEPPRA: 38.98 UG/ML (ref 6–46)
LDL CHOLESTEROL: 125 MG/DL (ref 9–159)
LYMPHOCYTE %: 41.4 % (ref 27–41)
LYMPHOCYTES # BLD: 3.5 10*3/UL (ref 1.3–4.4)
MCH RBC QN AUTO: 30.9 PG (ref 27–31)
MCHC RBC AUTO-ENTMCNC: 31.5 G/DL (ref 33–37)
MCV RBC AUTO: 98.3 FL (ref 81–99)
MONOCYTES # BLD: 0.5 10*3/UL (ref 0.1–1)
MONOCYTES %: 6.5 % (ref 3–9)
NEUTROPHILS %: 49 % (ref 47–73)
NUCLEATED RED BLOOD CELLS: 0 % (ref 0–0)
PDW BLD-RTO: 14 % (ref 0–14.5)
PLATELET # BLD: 245 10*3/UL (ref 130–400)
PMV BLD AUTO: 9.8 FL (ref 9.6–12.3)
POTASSIUM SERPL-SCNC: 4.3 MMOL/L (ref 3.4–5.1)
RBC # BLD: 4.17 10*6/UL (ref 4.1–5.1)
SODIUM BLD-SCNC: 139 MMOL/L (ref 136–145)
T3 UPTAKE: 24.5 % (ref 22.4–36.7)
T4 TOTAL: 5 UG/DL (ref 4.5–10.9)
T7 FREE THYROXINE INDEX: 1.2 (ref 1.5–5.4)
TOTAL PROTEIN: 6.7 GM/DL (ref 6–8)
TRIGL SERPL-MCNC: 301 MG/DL
TSH SERPL DL<=0.05 MIU/L-ACNC: 1.38 UIU/ML (ref 0.55–4.78)
URIC ACID: 3.5 MG/DL (ref 3.1–7.8)
VITAMIN B-12: 549 PG/ML (ref 211–911)
VITAMIN D 25-HYDROXY: 31.2 NG/ML (ref 30–100)
VLDLC SERPL CALC-MCNC: 60 MG/DL (ref 6–40)
WBC # BLD: 8.4 10*3/UL (ref 4.8–10.8)

## 2023-08-15 LAB
ABSOLUTE BASO #: 0 10*3/UL (ref 0–0.1)
ABSOLUTE EOS #: 0.2 10*3/UL (ref 0–0.4)
ABSOLUTE NEUT #: 5.5 10*3/UL (ref 2.3–7.9)
ALBUMIN: 3.5 GM/DL (ref 3.4–5)
ALP BLD-CCNC: 50 U/L (ref 46–116)
ALT SERPL-CCNC: 19 U/L (ref 10–49)
AST SERPL-CCNC: 24 IU/L (ref 0–34)
BASOPHILS %: 0.5 % (ref 0–1)
BILIRUB SERPL-MCNC: 0.2 MG/DL (ref 0.3–1.2)
BUN BLDV-MCNC: 17 MG/DL (ref 9–23)
CALCIUM SERPL-MCNC: 8.8 MD/DL (ref 8.7–10.4)
CHLORIDE BLD-SCNC: 105 MMOL/L (ref 98–107)
CHOLESTEROL: 249 MG/DL
CO2: 26 MMOL/L (ref 20–31)
CREAT SERPL-MCNC: 0.86 MG/DL (ref 0.55–1.02)
EOSINOPHILS %: 1.8 % (ref 1–4)
FOLIC ACID, SERUM: 20.63 NG/ML (ref 5.38–24)
GFR AFRICAN AMERICAN: > 60 ML/MIN
GFR SERPL CREATININE-BSD FRML MDRD: >60 ML/MIN/
GLUCOSE: 92 MG/DL (ref 65–99)
HCT VFR BLD CALC: 40.6 % (ref 37–47)
HDLC SERPL-MCNC: 46 MG/DL (ref 40–60)
HEMOGLOBIN: 13.1 G/DL (ref 12–16)
IMMATURE GRANULOCYTES #: 0 10*3/UL (ref 0–0.1)
IMMATURE GRANULOCYTES: 0.3 % (ref 0–1)
KEPPRA: 28.05 UG/ML (ref 6–46)
LDL CHOLESTEROL: 130 MG/DL (ref 9–159)
LYMPHOCYTE %: 27.1 % (ref 27–41)
LYMPHOCYTES # BLD: 2.4 10*3/UL (ref 1.3–4.4)
MCH RBC QN AUTO: 31.9 PG (ref 27–31)
MCHC RBC AUTO-ENTMCNC: 32.3 G/DL (ref 33–37)
MCV RBC AUTO: 98.8 FL (ref 81–99)
MONOCYTES # BLD: 0.7 10*3/UL (ref 0.1–1)
MONOCYTES %: 7.6 % (ref 3–9)
NEUTROPHILS %: 62.7 % (ref 47–73)
NUCLEATED RED BLOOD CELLS: 0 % (ref 0–0)
PDW BLD-RTO: 14.6 % (ref 0–14.5)
PLATELET # BLD: 280 10*3/UL (ref 130–400)
PMV BLD AUTO: 9.7 FL (ref 9.6–12.3)
POTASSIUM SERPL-SCNC: 4.1 MMOL/L (ref 3.4–5.1)
RBC # BLD: 4.11 10*6/UL (ref 4.1–5.1)
SODIUM BLD-SCNC: 141 MMOL/L (ref 136–145)
T3 UPTAKE: 23.3 % (ref 22.4–36.7)
T4 TOTAL: 6.1 UG/DL (ref 4.5–10.9)
T7 FREE THYROXINE INDEX: 1.4 (ref 1.5–5.4)
TOTAL PROTEIN: 7.1 GM/DL (ref 6–8)
TRIGL SERPL-MCNC: 364 MG/DL
TSH SERPL DL<=0.05 MIU/L-ACNC: 1.32 UIU/ML (ref 0.55–4.78)
URIC ACID: 4 MG/DL (ref 3.1–7.8)
VITAMIN B-12: 639 PG/ML (ref 211–911)
VITAMIN D 25-HYDROXY: 26.2 NG/ML (ref 30–100)
VLDLC SERPL CALC-MCNC: 73 MG/DL (ref 6–40)
WBC # BLD: 8.7 10*3/UL (ref 4.8–10.8)

## 2024-01-09 DIAGNOSIS — R56.9 SEIZURE (HCC): Primary | ICD-10-CM

## 2024-01-09 RX ORDER — CENOBAMATE 100 MG/1
TABLET, FILM COATED ORAL
Qty: 30 TABLET | Refills: 5 | Status: SHIPPED | OUTPATIENT
Start: 2024-01-09

## 2024-01-30 LAB
ABSOLUTE BASO #: 0 10*3/UL (ref 0–0.1)
ABSOLUTE EOS #: 0.2 10*3/UL (ref 0–0.4)
ABSOLUTE NEUT #: 3.7 10*3/UL (ref 2.3–7.9)
BASOPHILS %: 0.4 % (ref 0–1)
BUN BLDV-MCNC: 22 MG/DL (ref 9–23)
CALCIUM SERPL-MCNC: 9.1 MD/DL (ref 8.7–10.4)
CHLORIDE BLD-SCNC: 105 MMOL/L (ref 98–107)
CO2: 29 MMOL/L (ref 20–31)
CREAT SERPL-MCNC: 0.74 MG/DL (ref 0.55–1.02)
EOSINOPHILS %: 2.3 % (ref 1–4)
GFR AFRICAN AMERICAN: > 60 ML/MIN
GFR SERPL CREATININE-BSD FRML MDRD: >60 ML/MIN/
GLUCOSE: 99 MG/DL (ref 65–99)
HCT VFR BLD CALC: 41.9 % (ref 37–47)
HEMOGLOBIN: 13 G/DL (ref 12–16)
IMMATURE GRANULOCYTES #: 0.1 10*3/UL (ref 0–0.1)
IMMATURE GRANULOCYTES: 0.7 % (ref 0–1)
KEPPRA: 34.08 UG/ML (ref 6–46)
LYMPHOCYTE %: 40.8 % (ref 27–41)
LYMPHOCYTES # BLD: 3.1 10*3/UL (ref 1.3–4.4)
MCH RBC QN AUTO: 30.7 PG (ref 27–31)
MCHC RBC AUTO-ENTMCNC: 31 G/DL (ref 33–37)
MCV RBC AUTO: 99.1 FL (ref 81–99)
MONOCYTES # BLD: 0.6 10*3/UL (ref 0.1–1)
MONOCYTES %: 8.3 % (ref 3–9)
NEUTROPHILS %: 47.5 % (ref 47–73)
NUCLEATED RED BLOOD CELLS: 0 % (ref 0–0)
PDW BLD-RTO: 14.3 % (ref 0–14.5)
PLATELET # BLD: 291 10*3/UL (ref 130–400)
PMV BLD AUTO: 9.7 FL (ref 9.6–12.3)
POTASSIUM SERPL-SCNC: 4.1 MMOL/L (ref 3.4–5.1)
RBC # BLD: 4.23 10*6/UL (ref 4.1–5.1)
SODIUM BLD-SCNC: 139 MMOL/L (ref 136–145)
WBC # BLD: 7.7 10*3/UL (ref 4.8–10.8)

## 2024-02-08 LAB
LACOSAMIDE: 9.5 UG/ML (ref 5–10)
Lab: 18.9 UG/ML

## 2024-02-20 LAB
ABSOLUTE BASO #: 0 10*3/UL (ref 0–0.1)
ABSOLUTE EOS #: 0.2 10*3/UL (ref 0–0.4)
ABSOLUTE NEUT #: 3.4 10*3/UL (ref 2.3–7.9)
ALBUMIN: 3.4 GM/DL (ref 3.4–5)
ALP BLD-CCNC: 56 U/L (ref 46–116)
ALT SERPL-CCNC: 20 U/L (ref 5–49)
AST SERPL-CCNC: 23 IU/L (ref 0–34)
BASOPHILS %: 0.4 % (ref 0–1)
BILIRUB SERPL-MCNC: 0.2 MG/DL (ref 0.3–1.2)
BUN BLDV-MCNC: 20 MG/DL (ref 9–23)
CALCIUM SERPL-MCNC: 9.4 MD/DL (ref 8.7–10.4)
CHLORIDE BLD-SCNC: 106 MMOL/L (ref 98–107)
CHOLESTEROL: 298 MG/DL
CO2: 29 MMOL/L (ref 20–31)
CREAT SERPL-MCNC: 0.72 MG/DL (ref 0.55–1.02)
EOSINOPHILS %: 2.9 % (ref 1–4)
FOLIC ACID, SERUM: > 24 NG/ML (ref 5.38–24)
GFR AFRICAN AMERICAN: > 60 ML/MIN
GFR SERPL CREATININE-BSD FRML MDRD: >60 ML/MIN/
GLUCOSE: 90 MG/DL (ref 65–99)
HCT VFR BLD CALC: 41.6 % (ref 37–47)
HDLC SERPL-MCNC: 46 MG/DL (ref 40–60)
HEMOGLOBIN: 13 G/DL (ref 12–16)
IMMATURE GRANULOCYTES #: 0 10*3/UL (ref 0–0.1)
IMMATURE GRANULOCYTES: 0.6 % (ref 0–1)
KEPPRA: 36.99 UG/ML (ref 6–46)
LDL CHOLESTEROL: 179 MG/DL (ref 9–159)
LYMPHOCYTE %: 41.3 % (ref 27–41)
LYMPHOCYTES # BLD: 3 10*3/UL (ref 1.3–4.4)
MCH RBC QN AUTO: 31 PG (ref 27–31)
MCHC RBC AUTO-ENTMCNC: 31.3 G/DL (ref 33–37)
MCV RBC AUTO: 99.3 FL (ref 81–99)
MONOCYTES # BLD: 0.5 10*3/UL (ref 0.1–1)
MONOCYTES %: 7.5 % (ref 3–9)
NEUTROPHILS %: 47.3 % (ref 47–73)
NUCLEATED RED BLOOD CELLS: 0 % (ref 0–0)
PDW BLD-RTO: 14.5 % (ref 0–14.5)
PLATELET # BLD: 280 10*3/UL (ref 130–400)
PMV BLD AUTO: 9.9 FL (ref 9.6–12.3)
POTASSIUM SERPL-SCNC: 4.1 MMOL/L (ref 3.4–5.1)
RBC # BLD: 4.19 10*6/UL (ref 4.1–5.1)
SODIUM BLD-SCNC: 141 MMOL/L (ref 136–145)
T3 UPTAKE: 28.2 % (ref 22.4–36.7)
T3 UPTAKE: 28.2 % (ref 22.4–36.7)
T4 TOTAL: 4.8 UG/DL (ref 4.5–10.9)
T7 FREE THYROXINE INDEX: 1.4 (ref 1.5–5.4)
TOTAL PROTEIN: 7 GM/DL (ref 6–8)
TRIGL SERPL-MCNC: 365 MG/DL
TSH SERPL DL<=0.05 MIU/L-ACNC: 1.46 UIU/ML (ref 0.55–4.78)
URIC ACID: 3.7 MG/DL (ref 3.1–7.8)
VITAMIN B-12: 669 PG/ML (ref 211–911)
VITAMIN D 25-HYDROXY: 29.7 NG/ML (ref 30–100)
VLDLC SERPL CALC-MCNC: 73 MG/DL (ref 6–40)
WBC # BLD: 7.2 10*3/UL (ref 4.8–10.8)

## 2024-04-15 ENCOUNTER — APPOINTMENT (OUTPATIENT)
Dept: GENERAL RADIOLOGY | Age: 59
DRG: 193 | End: 2024-04-15
Payer: MEDICARE

## 2024-04-15 ENCOUNTER — APPOINTMENT (OUTPATIENT)
Dept: CT IMAGING | Age: 59
DRG: 193 | End: 2024-04-15
Payer: MEDICARE

## 2024-04-15 ENCOUNTER — APPOINTMENT (OUTPATIENT)
Dept: ULTRASOUND IMAGING | Age: 59
DRG: 193 | End: 2024-04-15
Payer: MEDICARE

## 2024-04-15 ENCOUNTER — HOSPITAL ENCOUNTER (INPATIENT)
Age: 59
LOS: 3 days | Discharge: HOME | DRG: 193 | End: 2024-04-18
Attending: EMERGENCY MEDICINE | Admitting: FAMILY MEDICINE
Payer: MEDICARE

## 2024-04-15 DIAGNOSIS — R56.9 SEIZURE (HCC): ICD-10-CM

## 2024-04-15 DIAGNOSIS — J96.21 ACUTE ON CHRONIC RESPIRATORY FAILURE WITH HYPOXIA (HCC): ICD-10-CM

## 2024-04-15 DIAGNOSIS — R50.9 FEVER, UNKNOWN ORIGIN: ICD-10-CM

## 2024-04-15 DIAGNOSIS — R41.82 ALTERED MENTAL STATUS, UNSPECIFIED ALTERED MENTAL STATUS TYPE: Primary | ICD-10-CM

## 2024-04-15 DIAGNOSIS — R65.10 SIRS (SYSTEMIC INFLAMMATORY RESPONSE SYNDROME) (HCC): ICD-10-CM

## 2024-04-15 LAB
ALBUMIN SERPL-MCNC: 3.9 G/DL (ref 3.5–5.2)
ALP SERPL-CCNC: 58 U/L (ref 35–104)
ALT SERPL-CCNC: 27 U/L (ref 0–32)
ANION GAP SERPL CALCULATED.3IONS-SCNC: 11 MMOL/L (ref 7–16)
AST SERPL-CCNC: 36 U/L (ref 0–31)
B PARAP IS1001 DNA NPH QL NAA+NON-PROBE: NOT DETECTED
B PERT DNA SPEC QL NAA+PROBE: NOT DETECTED
B.E.: -0.3 MMOL/L (ref -3–3)
BACTERIA URNS QL MICRO: ABNORMAL
BASOPHILS # BLD: 0.02 K/UL (ref 0–0.2)
BASOPHILS NFR BLD: 0 % (ref 0–2)
BILIRUB DIRECT SERPL-MCNC: <0.2 MG/DL (ref 0–0.3)
BILIRUB INDIRECT SERPL-MCNC: ABNORMAL MG/DL (ref 0–1)
BILIRUB SERPL-MCNC: <0.2 MG/DL (ref 0–1.2)
BILIRUB UR QL STRIP: NEGATIVE
BUN SERPL-MCNC: 21 MG/DL (ref 6–20)
C PNEUM DNA NPH QL NAA+NON-PROBE: NOT DETECTED
CALCIUM SERPL-MCNC: 8.4 MG/DL (ref 8.6–10.2)
CHLORIDE SERPL-SCNC: 109 MMOL/L (ref 98–107)
CLARITY UR: CLEAR
CO2 SERPL-SCNC: 24 MMOL/L (ref 22–29)
COHB: 1.2 % (ref 0–1.5)
COLOR UR: YELLOW
CREAT SERPL-MCNC: 0.8 MG/DL (ref 0.5–1)
CRITICAL: ABNORMAL
DATE ANALYZED: ABNORMAL
DATE OF COLLECTION: ABNORMAL
EKG ATRIAL RATE: 98 BPM
EKG P AXIS: 29 DEGREES
EKG P-R INTERVAL: 150 MS
EKG Q-T INTERVAL: 370 MS
EKG QRS DURATION: 124 MS
EKG QTC CALCULATION (BAZETT): 472 MS
EKG R AXIS: -20 DEGREES
EKG T AXIS: -26 DEGREES
EKG VENTRICULAR RATE: 98 BPM
EOSINOPHIL # BLD: 0.01 K/UL (ref 0.05–0.5)
EOSINOPHILS RELATIVE PERCENT: 0 % (ref 0–6)
ERYTHROCYTE [DISTWIDTH] IN BLOOD BY AUTOMATED COUNT: 14.8 % (ref 11.5–15)
FLUAV RNA NPH QL NAA+NON-PROBE: NOT DETECTED
FLUBV RNA NPH QL NAA+NON-PROBE: NOT DETECTED
GFR SERPL CREATININE-BSD FRML MDRD: >90 ML/MIN/1.73M2
GLUCOSE BLD-MCNC: 123 MG/DL (ref 74–99)
GLUCOSE SERPL-MCNC: 121 MG/DL (ref 74–99)
GLUCOSE UR STRIP-MCNC: NEGATIVE MG/DL
HADV DNA NPH QL NAA+NON-PROBE: NOT DETECTED
HCO3: 24.2 MMOL/L (ref 22–26)
HCOV 229E RNA NPH QL NAA+NON-PROBE: NOT DETECTED
HCOV HKU1 RNA NPH QL NAA+NON-PROBE: NOT DETECTED
HCOV NL63 RNA NPH QL NAA+NON-PROBE: NOT DETECTED
HCOV OC43 RNA NPH QL NAA+NON-PROBE: NOT DETECTED
HCT VFR BLD AUTO: 42.9 % (ref 34–48)
HGB BLD-MCNC: 13.4 G/DL (ref 11.5–15.5)
HGB UR QL STRIP.AUTO: ABNORMAL
HHB: 5.3 % (ref 0–5)
HMPV RNA NPH QL NAA+NON-PROBE: NOT DETECTED
HPIV1 RNA NPH QL NAA+NON-PROBE: NOT DETECTED
HPIV2 RNA NPH QL NAA+NON-PROBE: NOT DETECTED
HPIV3 RNA NPH QL NAA+NON-PROBE: NOT DETECTED
HPIV4 RNA NPH QL NAA+NON-PROBE: NOT DETECTED
IMM GRANULOCYTES # BLD AUTO: 0.04 K/UL (ref 0–0.58)
IMM GRANULOCYTES NFR BLD: 1 % (ref 0–5)
INFLUENZA A BY PCR: NOT DETECTED
INFLUENZA B BY PCR: NOT DETECTED
KETONES UR STRIP-MCNC: NEGATIVE MG/DL
LAB: ABNORMAL
LACTATE BLDV-SCNC: 1.8 MMOL/L (ref 0.5–2.2)
LEUKOCYTE ESTERASE UR QL STRIP: NEGATIVE
LYMPHOCYTES NFR BLD: 1.49 K/UL (ref 1.5–4)
LYMPHOCYTES RELATIVE PERCENT: 19 % (ref 20–42)
Lab: 1052
M PNEUMO DNA NPH QL NAA+NON-PROBE: NOT DETECTED
MCH RBC QN AUTO: 30.7 PG (ref 26–35)
MCHC RBC AUTO-ENTMCNC: 31.2 G/DL (ref 32–34.5)
MCV RBC AUTO: 98.4 FL (ref 80–99.9)
METHB: 0.3 % (ref 0–1.5)
MODE: ABNORMAL
MONOCYTES NFR BLD: 0.48 K/UL (ref 0.1–0.95)
MONOCYTES NFR BLD: 6 % (ref 2–12)
NEUTROPHILS NFR BLD: 74 % (ref 43–80)
NEUTS SEG NFR BLD: 5.79 K/UL (ref 1.8–7.3)
NITRITE UR QL STRIP: NEGATIVE
O2 CONTENT: 16.8 ML/DL
O2 SATURATION: 94.6 % (ref 92–98.5)
O2HB: 93.2 % (ref 94–97)
OPERATOR ID: 2856
PATIENT TEMP: 37 C
PCO2: 39.1 MMHG (ref 35–45)
PH BLOOD GAS: 7.41 (ref 7.35–7.45)
PH UR STRIP: 5.5 [PH] (ref 5–9)
PLATELET # BLD AUTO: 213 K/UL (ref 130–450)
PMV BLD AUTO: 9.9 FL (ref 7–12)
PO2: 75.6 MMHG (ref 75–100)
POTASSIUM SERPL-SCNC: 3.9 MMOL/L (ref 3.5–5)
PROCALCITONIN SERPL-MCNC: 0.2 NG/ML (ref 0–0.08)
PROT SERPL-MCNC: 7.2 G/DL (ref 6.4–8.3)
PROT UR STRIP-MCNC: NEGATIVE MG/DL
RBC # BLD AUTO: 4.36 M/UL (ref 3.5–5.5)
RBC #/AREA URNS HPF: ABNORMAL /HPF
RSV RNA NPH QL NAA+NON-PROBE: NOT DETECTED
RV+EV RNA NPH QL NAA+NON-PROBE: NOT DETECTED
SARS-COV-2 RDRP RESP QL NAA+PROBE: NOT DETECTED
SARS-COV-2 RNA NPH QL NAA+NON-PROBE: NOT DETECTED
SODIUM SERPL-SCNC: 144 MMOL/L (ref 132–146)
SOURCE, BLOOD GAS: ABNORMAL
SP GR UR STRIP: 1.02 (ref 1–1.03)
SPECIMEN DESCRIPTION: NORMAL
SPECIMEN DESCRIPTION: NORMAL
THB: 12.8 G/DL (ref 11.5–16.5)
TIME ANALYZED: 1057
TROPONIN I SERPL HS-MCNC: 14 NG/L (ref 0–9)
UROBILINOGEN UR STRIP-ACNC: 0.2 EU/DL (ref 0–1)
WBC #/AREA URNS HPF: ABNORMAL /HPF
WBC OTHER # BLD: 7.8 K/UL (ref 4.5–11.5)

## 2024-04-15 PROCEDURE — 2580000003 HC RX 258: Performed by: NURSE PRACTITIONER

## 2024-04-15 PROCEDURE — 2580000003 HC RX 258: Performed by: EMERGENCY MEDICINE

## 2024-04-15 PROCEDURE — 71275 CT ANGIOGRAPHY CHEST: CPT

## 2024-04-15 PROCEDURE — 6360000002 HC RX W HCPCS: Performed by: EMERGENCY MEDICINE

## 2024-04-15 PROCEDURE — 74177 CT ABD & PELVIS W/CONTRAST: CPT

## 2024-04-15 PROCEDURE — 2580000003 HC RX 258: Performed by: STUDENT IN AN ORGANIZED HEALTH CARE EDUCATION/TRAINING PROGRAM

## 2024-04-15 PROCEDURE — 6360000002 HC RX W HCPCS: Performed by: STUDENT IN AN ORGANIZED HEALTH CARE EDUCATION/TRAINING PROGRAM

## 2024-04-15 PROCEDURE — 76705 ECHO EXAM OF ABDOMEN: CPT

## 2024-04-15 PROCEDURE — 0202U NFCT DS 22 TRGT SARS-COV-2: CPT

## 2024-04-15 PROCEDURE — 85025 COMPLETE CBC W/AUTO DIFF WBC: CPT

## 2024-04-15 PROCEDURE — 82962 GLUCOSE BLOOD TEST: CPT

## 2024-04-15 PROCEDURE — 6360000004 HC RX CONTRAST MEDICATION: Performed by: RADIOLOGY

## 2024-04-15 PROCEDURE — 87081 CULTURE SCREEN ONLY: CPT

## 2024-04-15 PROCEDURE — 96365 THER/PROPH/DIAG IV INF INIT: CPT

## 2024-04-15 PROCEDURE — 93005 ELECTROCARDIOGRAM TRACING: CPT | Performed by: EMERGENCY MEDICINE

## 2024-04-15 PROCEDURE — 99285 EMERGENCY DEPT VISIT HI MDM: CPT

## 2024-04-15 PROCEDURE — 84145 PROCALCITONIN (PCT): CPT

## 2024-04-15 PROCEDURE — 82248 BILIRUBIN DIRECT: CPT

## 2024-04-15 PROCEDURE — 70450 CT HEAD/BRAIN W/O DYE: CPT

## 2024-04-15 PROCEDURE — 51702 INSERT TEMP BLADDER CATH: CPT

## 2024-04-15 PROCEDURE — 87635 SARS-COV-2 COVID-19 AMP PRB: CPT

## 2024-04-15 PROCEDURE — 96375 TX/PRO/DX INJ NEW DRUG ADDON: CPT

## 2024-04-15 PROCEDURE — 71045 X-RAY EXAM CHEST 1 VIEW: CPT

## 2024-04-15 PROCEDURE — 87502 INFLUENZA DNA AMP PROBE: CPT

## 2024-04-15 PROCEDURE — 87077 CULTURE AEROBIC IDENTIFY: CPT

## 2024-04-15 PROCEDURE — 80053 COMPREHEN METABOLIC PANEL: CPT

## 2024-04-15 PROCEDURE — 87086 URINE CULTURE/COLONY COUNT: CPT

## 2024-04-15 PROCEDURE — 87899 AGENT NOS ASSAY W/OPTIC: CPT

## 2024-04-15 PROCEDURE — 87449 NOS EACH ORGANISM AG IA: CPT

## 2024-04-15 PROCEDURE — 81001 URINALYSIS AUTO W/SCOPE: CPT

## 2024-04-15 PROCEDURE — 84484 ASSAY OF TROPONIN QUANT: CPT

## 2024-04-15 PROCEDURE — 82805 BLOOD GASES W/O2 SATURATION: CPT

## 2024-04-15 PROCEDURE — 83605 ASSAY OF LACTIC ACID: CPT

## 2024-04-15 PROCEDURE — 87154 CUL TYP ID BLD PTHGN 6+ TRGT: CPT

## 2024-04-15 PROCEDURE — 2060000000 HC ICU INTERMEDIATE R&B

## 2024-04-15 PROCEDURE — 009U3ZX DRAINAGE OF SPINAL CANAL, PERCUTANEOUS APPROACH, DIAGNOSTIC: ICD-10-PCS | Performed by: EMERGENCY MEDICINE

## 2024-04-15 PROCEDURE — 93010 ELECTROCARDIOGRAM REPORT: CPT | Performed by: INTERNAL MEDICINE

## 2024-04-15 PROCEDURE — 62270 DX LMBR SPI PNXR: CPT

## 2024-04-15 PROCEDURE — 87040 BLOOD CULTURE FOR BACTERIA: CPT

## 2024-04-15 RX ORDER — SODIUM CHLORIDE 9 MG/ML
INJECTION, SOLUTION INTRAVENOUS ONCE
Status: COMPLETED | OUTPATIENT
Start: 2024-04-15 | End: 2024-04-15

## 2024-04-15 RX ORDER — LEVETIRACETAM 500 MG/5ML
1500 INJECTION, SOLUTION, CONCENTRATE INTRAVENOUS EVERY 12 HOURS
Status: DISCONTINUED | OUTPATIENT
Start: 2024-04-15 | End: 2024-04-18 | Stop reason: HOSPADM

## 2024-04-15 RX ORDER — LEVETIRACETAM 500 MG/1
1500 TABLET ORAL 2 TIMES DAILY
Status: DISCONTINUED | OUTPATIENT
Start: 2024-04-15 | End: 2024-04-18 | Stop reason: HOSPADM

## 2024-04-15 RX ORDER — CETIRIZINE HYDROCHLORIDE 10 MG/1
10 TABLET ORAL DAILY
Status: DISCONTINUED | OUTPATIENT
Start: 2024-04-15 | End: 2024-04-18 | Stop reason: HOSPADM

## 2024-04-15 RX ORDER — SODIUM CHLORIDE 0.9 % (FLUSH) 0.9 %
10 SYRINGE (ML) INJECTION PRN
Status: DISCONTINUED | OUTPATIENT
Start: 2024-04-15 | End: 2024-04-18 | Stop reason: HOSPADM

## 2024-04-15 RX ORDER — ASENAPINE 5 MG/1
10 TABLET SUBLINGUAL NIGHTLY
Status: DISCONTINUED | OUTPATIENT
Start: 2024-04-15 | End: 2024-04-18 | Stop reason: HOSPADM

## 2024-04-15 RX ORDER — LACOSAMIDE 100 MG/1
225 TABLET ORAL EVERY MORNING
Status: DISCONTINUED | OUTPATIENT
Start: 2024-04-16 | End: 2024-04-18 | Stop reason: HOSPADM

## 2024-04-15 RX ORDER — ALLOPURINOL 300 MG/1
300 TABLET ORAL DAILY
Status: DISCONTINUED | OUTPATIENT
Start: 2024-04-15 | End: 2024-04-18 | Stop reason: HOSPADM

## 2024-04-15 RX ORDER — NYSTATIN 100000 [USP'U]/G
POWDER TOPICAL 4 TIMES DAILY
COMMUNITY

## 2024-04-15 RX ORDER — POTASSIUM CHLORIDE 20 MEQ/1
40 TABLET, EXTENDED RELEASE ORAL PRN
Status: DISCONTINUED | OUTPATIENT
Start: 2024-04-15 | End: 2024-04-18 | Stop reason: HOSPADM

## 2024-04-15 RX ORDER — ONDANSETRON 2 MG/ML
4 INJECTION INTRAMUSCULAR; INTRAVENOUS EVERY 6 HOURS PRN
Status: DISCONTINUED | OUTPATIENT
Start: 2024-04-15 | End: 2024-04-18 | Stop reason: HOSPADM

## 2024-04-15 RX ORDER — ACETAMINOPHEN 650 MG/1
650 SUPPOSITORY RECTAL EVERY 6 HOURS PRN
Status: DISCONTINUED | OUTPATIENT
Start: 2024-04-15 | End: 2024-04-18 | Stop reason: HOSPADM

## 2024-04-15 RX ORDER — RUFINAMIDE 400 MG/1
1600 TABLET, FILM COATED ORAL 2 TIMES DAILY
Status: DISCONTINUED | OUTPATIENT
Start: 2024-04-15 | End: 2024-04-18 | Stop reason: HOSPADM

## 2024-04-15 RX ORDER — ACETAMINOPHEN 325 MG/1
650 TABLET ORAL EVERY 6 HOURS PRN
Status: DISCONTINUED | OUTPATIENT
Start: 2024-04-15 | End: 2024-04-18 | Stop reason: HOSPADM

## 2024-04-15 RX ORDER — KETOROLAC TROMETHAMINE 15 MG/ML
15 INJECTION, SOLUTION INTRAMUSCULAR; INTRAVENOUS ONCE
Status: COMPLETED | OUTPATIENT
Start: 2024-04-15 | End: 2024-04-15

## 2024-04-15 RX ORDER — POLYETHYLENE GLYCOL 3350 17 G/17G
17 POWDER, FOR SOLUTION ORAL DAILY
Status: DISCONTINUED | OUTPATIENT
Start: 2024-04-15 | End: 2024-04-18 | Stop reason: HOSPADM

## 2024-04-15 RX ORDER — POTASSIUM CHLORIDE 7.45 MG/ML
10 INJECTION INTRAVENOUS PRN
Status: DISCONTINUED | OUTPATIENT
Start: 2024-04-15 | End: 2024-04-18 | Stop reason: HOSPADM

## 2024-04-15 RX ORDER — LACOSAMIDE 100 MG/1
100 TABLET ORAL 2 TIMES DAILY
Status: DISCONTINUED | OUTPATIENT
Start: 2024-04-15 | End: 2024-04-15 | Stop reason: SDUPTHER

## 2024-04-15 RX ORDER — FENOFIBRATE 160 MG/1
160 TABLET ORAL DAILY
COMMUNITY

## 2024-04-15 RX ORDER — LACOSAMIDE 100 MG/1
300 TABLET ORAL NIGHTLY
Status: DISCONTINUED | OUTPATIENT
Start: 2024-04-15 | End: 2024-04-18 | Stop reason: HOSPADM

## 2024-04-15 RX ORDER — ONDANSETRON 4 MG/1
4 TABLET, ORALLY DISINTEGRATING ORAL EVERY 8 HOURS PRN
Status: DISCONTINUED | OUTPATIENT
Start: 2024-04-15 | End: 2024-04-18 | Stop reason: HOSPADM

## 2024-04-15 RX ORDER — QUETIAPINE FUMARATE 100 MG/1
300 TABLET, FILM COATED ORAL DAILY
Status: DISCONTINUED | OUTPATIENT
Start: 2024-04-15 | End: 2024-04-18 | Stop reason: HOSPADM

## 2024-04-15 RX ORDER — FENOFIBRATE 160 MG/1
160 TABLET ORAL DAILY
Status: DISCONTINUED | OUTPATIENT
Start: 2024-04-15 | End: 2024-04-18 | Stop reason: HOSPADM

## 2024-04-15 RX ORDER — DOCUSATE SODIUM 100 MG/1
200 CAPSULE, LIQUID FILLED ORAL DAILY
Status: DISCONTINUED | OUTPATIENT
Start: 2024-04-15 | End: 2024-04-18 | Stop reason: HOSPADM

## 2024-04-15 RX ORDER — ATORVASTATIN CALCIUM 40 MG/1
40 TABLET, FILM COATED ORAL DAILY
Status: DISCONTINUED | OUTPATIENT
Start: 2024-04-15 | End: 2024-04-18 | Stop reason: HOSPADM

## 2024-04-15 RX ORDER — LEVOTHYROXINE SODIUM 0.1 MG/1
100 TABLET ORAL DAILY
Status: DISCONTINUED | OUTPATIENT
Start: 2024-04-16 | End: 2024-04-18 | Stop reason: HOSPADM

## 2024-04-15 RX ORDER — POLYETHYLENE GLYCOL 3350 17 G/17G
17 POWDER, FOR SOLUTION ORAL DAILY
COMMUNITY

## 2024-04-15 RX ORDER — SODIUM CHLORIDE 9 MG/ML
INJECTION, SOLUTION INTRAVENOUS CONTINUOUS
Status: DISCONTINUED | OUTPATIENT
Start: 2024-04-15 | End: 2024-04-16

## 2024-04-15 RX ORDER — SODIUM CHLORIDE 0.9 % (FLUSH) 0.9 %
5-40 SYRINGE (ML) INJECTION EVERY 12 HOURS SCHEDULED
Status: DISCONTINUED | OUTPATIENT
Start: 2024-04-15 | End: 2024-04-18 | Stop reason: HOSPADM

## 2024-04-15 RX ORDER — MAGNESIUM SULFATE IN WATER 40 MG/ML
2000 INJECTION, SOLUTION INTRAVENOUS PRN
Status: DISCONTINUED | OUTPATIENT
Start: 2024-04-15 | End: 2024-04-18 | Stop reason: HOSPADM

## 2024-04-15 RX ORDER — POLYETHYLENE GLYCOL 3350 17 G/17G
17 POWDER, FOR SOLUTION ORAL DAILY PRN
Status: DISCONTINUED | OUTPATIENT
Start: 2024-04-15 | End: 2024-04-18 | Stop reason: HOSPADM

## 2024-04-15 RX ORDER — SODIUM CHLORIDE 9 MG/ML
INJECTION, SOLUTION INTRAVENOUS PRN
Status: DISCONTINUED | OUTPATIENT
Start: 2024-04-15 | End: 2024-04-18 | Stop reason: HOSPADM

## 2024-04-15 RX ORDER — 0.9 % SODIUM CHLORIDE 0.9 %
1000 INTRAVENOUS SOLUTION INTRAVENOUS ONCE
Status: COMPLETED | OUTPATIENT
Start: 2024-04-15 | End: 2024-04-15

## 2024-04-15 RX ADMIN — SODIUM CHLORIDE: 9 INJECTION, SOLUTION INTRAVENOUS at 13:03

## 2024-04-15 RX ADMIN — SODIUM CHLORIDE 1000 ML: 9 INJECTION, SOLUTION INTRAVENOUS at 09:25

## 2024-04-15 RX ADMIN — ACYCLOVIR SODIUM 650 MG: 50 INJECTION, SOLUTION INTRAVENOUS at 17:17

## 2024-04-15 RX ADMIN — SODIUM CHLORIDE: 9 INJECTION, SOLUTION INTRAVENOUS at 22:10

## 2024-04-15 RX ADMIN — IOPAMIDOL 75 ML: 755 INJECTION, SOLUTION INTRAVENOUS at 11:21

## 2024-04-15 RX ADMIN — KETOROLAC TROMETHAMINE 15 MG: 15 INJECTION, SOLUTION INTRAMUSCULAR; INTRAVENOUS at 11:50

## 2024-04-15 RX ADMIN — CEFEPIME 2000 MG: 2 INJECTION, POWDER, FOR SOLUTION INTRAVENOUS at 22:12

## 2024-04-15 RX ADMIN — CEFEPIME 2000 MG: 2 INJECTION, POWDER, FOR SOLUTION INTRAVENOUS at 11:55

## 2024-04-15 RX ADMIN — VANCOMYCIN HYDROCHLORIDE 2000 MG: 10 INJECTION, POWDER, LYOPHILIZED, FOR SOLUTION INTRAVENOUS at 13:05

## 2024-04-15 ASSESSMENT — LIFESTYLE VARIABLES
HOW MANY STANDARD DRINKS CONTAINING ALCOHOL DO YOU HAVE ON A TYPICAL DAY: PATIENT DOES NOT DRINK
HOW OFTEN DO YOU HAVE A DRINK CONTAINING ALCOHOL: NEVER

## 2024-04-15 ASSESSMENT — PAIN - FUNCTIONAL ASSESSMENT: PAIN_FUNCTIONAL_ASSESSMENT: NONE - DENIES PAIN

## 2024-04-15 NOTE — CONSULTS
[facility]  [facility]    Consultation Note     Admit Date: 4/15/2024  8:28 AM    Reason for Consult:   fever, unknown origin    Attending Physician:  Karin Hopkins MD     Chief Complaint: AMS    HISTORY OF PRESENT ILLNESS:   The patient is a 58 y.o.  female known to the Infectious Diseases service. The patient was seen by Dr. Garcia in 2022, records unavailable to me. She presented to ER with AMS, hypoxia on 2l Oxygen. She is very lethargic,. Wakes up to touch and looks at me and goes back to sleep.doesnot provide any hx.    Since admission pt is febrile to 100.4, HS stable on 2L NC. Labs showed normal white count. Hgb is 13.4 lfts showed mildly elevated ast. BMP normal. Lactic acid is normal. UA is clean. Blood cx in process. ABG is normal. CT head showed no acute intracranial abnormalities. CT chest showed bibasilar infiltrates. CT A/P The gallbladder is distended/hydropic. No other discrete gallbladder abnormality is appreciated. If clinically warranted, gallbladder ultrasound  can be considered. 2. Redundant colon with a moderate amount of retained stool and gaseous  distension of the cecum. Patient receiving IV vancomycin/acyclovir/meropenem. I got consulted for antibiotic management.    Past Medical History:        Diagnosis Date    COVID-19 11/2021    Hyperlipidemia     MR (mental retardation), moderate     Seizures (HCC)     HAS VNS    Thyroid disease      Past Surgical History:        Procedure Laterality Date    DENTAL SURGERY N/A 2/25/2022    EXAM, RADIOGRAPHS, ADULT PROPHYLAXIS, FLUORIDE, RESTORATIONS, EXTRACTION x1 performed by Ann Merlos DDS [place] [facility]    ENDOMETRIAL ABLATION      OTHER SURGICAL HISTORY      VAGAL NERVE STIMULATOR     Current Medications:   Scheduled Meds:   vancomycin  2,000 mg IntraVENous Once    meropenem  2,000 mg IntraVENous Once    acyclovir  10 mg/kg (Adjusted) IntraVENous Once     Continuous Infusions:  PRN Meds:    Allergies:   edema.  Musculoskeletal: no gross focal abnormalities  Neurological: lethargic,  Lines: peripheral      CBC+dif:  Recent Labs     04/15/24  0842   WBC 7.8   HGB 13.4   HCT 42.9   MCV 98.4      NEUTROABS 5.79     No results found for: \"CRP\"  No results found for: \"CRPHS\"  No results found for: \"SEDRATE\"  Lab Results   Component Value Date    ALT 27 04/15/2024    AST 36 (H) 04/15/2024    ALKPHOS 58 04/15/2024    BILITOT <0.2 04/15/2024     Lab Results   Component Value Date/Time     04/15/2024 08:42 AM    K 3.9 04/15/2024 08:42 AM     04/15/2024 08:42 AM    CO2 24 04/15/2024 08:42 AM    BUN 21 04/15/2024 08:42 AM    CREATININE 0.8 04/15/2024 08:42 AM    GFRAA > 60 02/20/2024 04:10 AM    LABGLOM >90 04/15/2024 08:42 AM    GLUCOSE 121 04/15/2024 08:42 AM    GLUCOSE 90 02/20/2024 04:10 AM    PROT 7.2 04/15/2024 08:42 AM    LABALBU 3.9 04/15/2024 08:42 AM    LABALBU 3.4 02/20/2024 04:10 AM    CALCIUM 8.4 04/15/2024 08:42 AM    BILITOT <0.2 04/15/2024 08:42 AM    BILITOT Negative 12/12/2022 05:30 PM    ALKPHOS 58 04/15/2024 08:42 AM    AST 36 04/15/2024 08:42 AM    ALT 27 04/15/2024 08:42 AM       Lab Results   Component Value Date/Time    PROTIME 10.6 12/13/2022 04:21 AM    INR 1.0 12/13/2022 04:21 AM       Lab Results   Component Value Date/Time    TSH 1.460 02/20/2024 04:10 AM       Lab Results   Component Value Date/Time    COLORU Yellow 04/15/2024 11:42 AM    PHUR 5.5 04/15/2024 11:42 AM    WBCUA 0 TO 5 04/15/2024 11:42 AM    WBCUA 2-4 12/12/2022 05:30 PM    RBCUA 0 TO 2 04/15/2024 11:42 AM    RBCUA NEGATIVE 05/02/2022 12:05 PM    BACTERIA 1+ 04/15/2024 11:42 AM    CLARITYU Turbid 12/12/2022 05:30 PM    SPECGRAV 1.025 04/15/2024 11:42 AM    LEUKOCYTESUR NEGATIVE 04/15/2024 11:42 AM    UROBILINOGEN 0.2 04/15/2024 11:42 AM    BILIRUBINUR NEGATIVE 04/15/2024 11:42 AM    BLOODU Negative 06/25/2018 07:12 AM    GLUCOSEU NEGATIVE 04/15/2024 11:42 AM    AMORPHOUS FEW 09/16/2012 05:10 PM       No results

## 2024-04-15 NOTE — ED NOTES
ED to Inpatient Handoff Report    Notified nia rn that electronic handoff available and patient ready for transport to room 643.    Safety Risks: Home safety issues, Difficulty with daily activities, and Risk of falls    Patient in Restraints: no    Constant Observer or Patient : no    Telemetry Monitoring Ordered :Yes           Order to transfer to unit without monitor:NO    Last MEWS: 2   Time completed: 1414    Deterioration Index Score:   Predictive Model Details          44 (Caution)  Factor Value    Calculated 4/15/2024 14:13 35% Respiratory rate 28    Deterioration Index Model 25% Supplemental oxygen Nasal cannula     21% Age 58 years old     7% Sodium 144 mmol/L     5% Systolic 102     2% Pulse oximetry 93 %     2% BUN abnormal (21 mg/dL)     2% Temperature 100 °F (37.8 °C)     1% Blood pH 7.409     0% Pulse 83     0% Hematocrit 42.9 %     0% WBC count 7.8 k/uL     0% Potassium 3.9 mmol/L        Vitals:    04/15/24 1146 04/15/24 1227 04/15/24 1258 04/15/24 1345   BP: 104/69   102/71   Pulse: 88 87  83   Resp: 26 27  28   Temp: 100.2 °F (37.9 °C) 100.4 °F (38 °C)  100 °F (37.8 °C)   TempSrc:  Bladder     SpO2: 94% 93%  93%   Weight:       Height:   1.549 m (5' 1\")          Opportunity for questions and clarification was provided.

## 2024-04-15 NOTE — PROGRESS NOTES
Database initiated. Patient is AX0 comes in from [facility]. She requires a wheelchair and wears 1 liter oxygen while sleeping. She seems to be nonverbal. Full code status and medications verified using facility paperwork. Regular diet cut up meats and assist with meals.

## 2024-04-15 NOTE — ED PROVIDER NOTES
58-year-old female presents to the emergency department with lethargy altered mental status and reportedly patient is hypoxic.  She chronically wears 2 L of oxygen but was requiring 4 L per EMS.  She did not take any of her home medications.  Last known appropriate well was 530 this morning.  She is not able to provide any history.  Patient has no other known complications or medical issues.  She has a known history of seizures and mental retardation.  EMS does report when I asked her if she has pain she does point to her frontal head    The history is provided by the patient.   Fatigue  Severity:  Moderate  Onset quality:  Gradual  Duration:  3 hours  Timing:  Intermittent  Progression:  Waxing and waning  Chronicity:  New  Relieved by:  Nothing  Worsened by:  Nothing  Ineffective treatments:  None tried  Associated symptoms: headaches         Review of Systems   Unable to perform ROS: Mental status change   Constitutional:  Positive for fatigue.   Neurological:  Positive for headaches.        Physical Exam  Constitutional:       General: She is sleeping.      Appearance: She is obese. She is ill-appearing.   HENT:      Head: Normocephalic and atraumatic.      Nose: Nose normal.      Mouth/Throat:      Mouth: Mucous membranes are moist.   Eyes:      Extraocular Movements: Extraocular movements intact.      Pupils: Pupils are equal, round, and reactive to light.   Cardiovascular:      Rate and Rhythm: Normal rate and regular rhythm.      Pulses: Normal pulses.      Heart sounds: Normal heart sounds.   Pulmonary:      Effort: Pulmonary effort is normal.      Breath sounds: Normal breath sounds.   Abdominal:      General: Abdomen is flat. Bowel sounds are normal. There is no distension.      Palpations: Abdomen is soft.      Tenderness: There is no abdominal tenderness. There is no guarding.   Musculoskeletal:         General: Normal range of motion.      Cervical back: Normal range of motion and neck supple.  Saturation Interpretation: Normal    ------------------------------------------ PROGRESS NOTES ------------------------------------------  Counseling:  I have spoken with the patient and discussed today’s results, in addition to providing specific details for the plan of care and counseling regarding the diagnosis and prognosis.  Their questions are answered at this time and they are agreeable with the plan of admission.    --------------------------------- ADDITIONAL PROVIDER NOTES ---------------------------------  This patient's ED course included: a personal history and physicial examination, re-evaluation prior to disposition, multiple bedside re-evaluations, IV medications, cardiac monitoring, continuous pulse oximetry, and complex medical decision making and emergency management    This patient has remained hemodynamically stable during their ED course.    Diagnosis:  1. Altered mental status, unspecified altered mental status type    2. SIRS (systemic inflammatory response syndrome) (HCC)    3. Fever, unknown origin    4. Acute on chronic respiratory failure with hypoxia (HCC)      Please note that the withdrawal or failure to initiate urgent interventions for this patient would likely result in a life threatening deterioration or permanent disability.      Accordingly this patient received 30 minutes of critical care time, excluding separately billable procedures.      Disposition:  Patient's disposition: Admit to telemetry  Patient's condition is serious.           Leonel Parish DO  04/16/24 1024

## 2024-04-16 LAB
ANION GAP SERPL CALCULATED.3IONS-SCNC: 12 MMOL/L (ref 7–16)
BASOPHILS # BLD: 0.02 K/UL (ref 0–0.2)
BASOPHILS NFR BLD: 0 % (ref 0–2)
BUN SERPL-MCNC: 16 MG/DL (ref 6–20)
CALCIUM SERPL-MCNC: 7.8 MG/DL (ref 8.6–10.2)
CHLORIDE SERPL-SCNC: 113 MMOL/L (ref 98–107)
CO2 SERPL-SCNC: 22 MMOL/L (ref 22–29)
CREAT SERPL-MCNC: 0.6 MG/DL (ref 0.5–1)
EOSINOPHIL # BLD: 0.09 K/UL (ref 0.05–0.5)
EOSINOPHILS RELATIVE PERCENT: 1 % (ref 0–6)
ERYTHROCYTE [DISTWIDTH] IN BLOOD BY AUTOMATED COUNT: 14.7 % (ref 11.5–15)
GFR SERPL CREATININE-BSD FRML MDRD: >90 ML/MIN/1.73M2
GLUCOSE SERPL-MCNC: 80 MG/DL (ref 74–99)
HCT VFR BLD AUTO: 36.7 % (ref 34–48)
HGB BLD-MCNC: 11.4 G/DL (ref 11.5–15.5)
IMM GRANULOCYTES # BLD AUTO: 0.04 K/UL (ref 0–0.58)
IMM GRANULOCYTES NFR BLD: 1 % (ref 0–5)
INR PPP: 1.1
L PNEUMO1 AG UR QL IA.RAPID: NEGATIVE
LYMPHOCYTES NFR BLD: 2.19 K/UL (ref 1.5–4)
LYMPHOCYTES RELATIVE PERCENT: 33 % (ref 20–42)
MCH RBC QN AUTO: 30.9 PG (ref 26–35)
MCHC RBC AUTO-ENTMCNC: 31.1 G/DL (ref 32–34.5)
MCV RBC AUTO: 99.5 FL (ref 80–99.9)
MICROORGANISM SPEC CULT: ABNORMAL
MONOCYTES NFR BLD: 0.55 K/UL (ref 0.1–0.95)
MONOCYTES NFR BLD: 8 % (ref 2–12)
NEUTROPHILS NFR BLD: 56 % (ref 43–80)
NEUTS SEG NFR BLD: 3.74 K/UL (ref 1.8–7.3)
PLATELET # BLD AUTO: 198 K/UL (ref 130–450)
PMV BLD AUTO: 9.9 FL (ref 7–12)
POTASSIUM SERPL-SCNC: 4 MMOL/L (ref 3.5–5)
PROTHROMBIN TIME: 12.6 SEC (ref 9.3–12.4)
RBC # BLD AUTO: 3.69 M/UL (ref 3.5–5.5)
S PNEUM AG SPEC QL: NEGATIVE
SODIUM SERPL-SCNC: 147 MMOL/L (ref 132–146)
SPECIMEN DESCRIPTION: ABNORMAL
SPECIMEN SOURCE: NORMAL
WBC OTHER # BLD: 6.6 K/UL (ref 4.5–11.5)

## 2024-04-16 PROCEDURE — 2580000003 HC RX 258

## 2024-04-16 PROCEDURE — 97161 PT EVAL LOW COMPLEX 20 MIN: CPT

## 2024-04-16 PROCEDURE — 6360000002 HC RX W HCPCS

## 2024-04-16 PROCEDURE — 36415 COLL VENOUS BLD VENIPUNCTURE: CPT

## 2024-04-16 PROCEDURE — 6360000002 HC RX W HCPCS: Performed by: STUDENT IN AN ORGANIZED HEALTH CARE EDUCATION/TRAINING PROGRAM

## 2024-04-16 PROCEDURE — 2700000000 HC OXYGEN THERAPY PER DAY

## 2024-04-16 PROCEDURE — 85610 PROTHROMBIN TIME: CPT

## 2024-04-16 PROCEDURE — 6370000000 HC RX 637 (ALT 250 FOR IP): Performed by: NURSE PRACTITIONER

## 2024-04-16 PROCEDURE — 97165 OT EVAL LOW COMPLEX 30 MIN: CPT

## 2024-04-16 PROCEDURE — C9254 INJECTION, LACOSAMIDE: HCPCS

## 2024-04-16 PROCEDURE — 2580000003 HC RX 258: Performed by: STUDENT IN AN ORGANIZED HEALTH CARE EDUCATION/TRAINING PROGRAM

## 2024-04-16 PROCEDURE — 80048 BASIC METABOLIC PNL TOTAL CA: CPT

## 2024-04-16 PROCEDURE — 85025 COMPLETE CBC W/AUTO DIFF WBC: CPT

## 2024-04-16 PROCEDURE — 2060000000 HC ICU INTERMEDIATE R&B

## 2024-04-16 RX ADMIN — CEFEPIME 2000 MG: 2 INJECTION, POWDER, FOR SOLUTION INTRAVENOUS at 23:04

## 2024-04-16 RX ADMIN — DOCUSATE SODIUM 200 MG: 100 CAPSULE, LIQUID FILLED ORAL at 13:02

## 2024-04-16 RX ADMIN — LACOSAMIDE 300 MG: 10 INJECTION INTRAVENOUS at 21:03

## 2024-04-16 RX ADMIN — LACOSAMIDE 300 MG: 10 INJECTION INTRAVENOUS at 00:30

## 2024-04-16 RX ADMIN — CEFEPIME 2000 MG: 2 INJECTION, POWDER, FOR SOLUTION INTRAVENOUS at 11:50

## 2024-04-16 RX ADMIN — ALLOPURINOL 300 MG: 300 TABLET ORAL at 12:59

## 2024-04-16 RX ADMIN — LEVETIRACETAM 1500 MG: 100 INJECTION, SOLUTION INTRAVENOUS at 23:04

## 2024-04-16 RX ADMIN — LACOSAMIDE 200 MG: 10 INJECTION INTRAVENOUS at 10:31

## 2024-04-16 RX ADMIN — ATORVASTATIN CALCIUM 40 MG: 40 TABLET, FILM COATED ORAL at 12:59

## 2024-04-16 RX ADMIN — CETIRIZINE HYDROCHLORIDE 10 MG: 10 TABLET, FILM COATED ORAL at 12:59

## 2024-04-16 RX ADMIN — QUETIAPINE FUMARATE 300 MG: 100 TABLET ORAL at 12:59

## 2024-04-16 RX ADMIN — LEVETIRACETAM 1500 MG: 100 INJECTION, SOLUTION INTRAVENOUS at 00:03

## 2024-04-16 RX ADMIN — FENOFIBRATE 160 MG: 160 TABLET ORAL at 13:03

## 2024-04-16 RX ADMIN — LEVETIRACETAM 1500 MG: 100 INJECTION, SOLUTION INTRAVENOUS at 11:24

## 2024-04-16 ASSESSMENT — PAIN SCALES - GENERAL
PAINLEVEL_OUTOF10: 0
PAINLEVEL_OUTOF10: 0

## 2024-04-16 NOTE — PLAN OF CARE
Problem: ABCDS Injury Assessment  Goal: Absence of physical injury  Outcome: Progressing     Problem: Skin/Tissue Integrity  Goal: Absence of new skin breakdown  Description: 1.  Monitor for areas of redness and/or skin breakdown  2.  Assess vascular access sites hourly  3.  Every 4-6 hours minimum:  Change oxygen saturation probe site  4.  Every 4-6 hours:  If on nasal continuous positive airway pressure, respiratory therapy assess nares and determine need for appliance change or resting period.  Outcome: Progressing     Problem: Discharge Planning  Goal: Discharge to home or other facility with appropriate resources  4/16/2024 1050 by Adeline Costello RN  Outcome: Progressing  4/15/2024 2051 by Izabel Aguilar RN  Outcome: Progressing     Problem: Safety - Adult  Goal: Free from fall injury  4/16/2024 1050 by Adeline Costello RN  Outcome: Progressing  4/15/2024 2051 by Izabel Aguilar RN  Outcome: Progressing     Problem: Nutrition Deficit:  Goal: Optimize nutritional status  4/16/2024 1050 by Adeline Costello RN  Outcome: Progressing  4/15/2024 2051 by Izabel Aguilar RN  Outcome: Progressing     Problem: Pain  Goal: Verbalizes/displays adequate comfort level or baseline comfort level  Outcome: Progressing

## 2024-04-16 NOTE — PROGRESS NOTES
4 Eyes Skin Assessment     NAME:  Jessie Pichardo  YOB: 1965  MEDICAL RECORD NUMBER:  [id number]    The patient is being assessed for  Admission    I agree that at least one RN has performed a thorough Head to Toe Skin Assessment on the patient. ALL assessment sites listed below have been assessed.      Areas assessed by both nurses:    Head, Face, Ears, Shoulders, Back, Chest, Arms, Elbows, Hands, Sacrum. Buttock, Coccyx, Ischium, Legs. Feet and Heels, and Under Medical Devices         Does the Patient have a Wound? No noted wound(s)       Reinaldo Prevention initiated by RN: Yes  Wound Care Orders initiated by RN: No    Pressure Injury (Stage 3,4, Unstageable, DTI, NWPT, and Complex wounds) if present, place Wound referral order by RN under : No    New Ostomies, if present place, Ostomy referral order under : No     Nurse 1 eSignature: Electronically signed by Mariam Almonte RN on 4/15/24 at 10:37 PM EDT    **SHARE this note so that the co-signing nurse can place an eSignature**    Nurse 2 eSignature: Electronically signed by Lizz Johansen RN on 4/15/24 at 10:39 PM EDT

## 2024-04-16 NOTE — PROGRESS NOTES
Jessie Pichardo was ordered asenapine (Saphris), dextromethorphan-quinidine (Nuedexta), and rufinamide (Banzel)  which are nonformulary medications.  Nurse is going to check with patient to see if home supply of these medications will be brought in to the hospital for inpatient use.    If it is determined that the patient cannot supply the medications and they not available to be dispensed from the pharmacy, a call will be placed to the ordering provider to discuss alternative options.     Kang Abraham, PharmD  04/15/24 8:02 PM

## 2024-04-16 NOTE — PROGRESS NOTES
Physical Therapy  Facility/Department: [address] INTERMEDIATE  Physical Therapy Initial Assessment    Name: Jessie Pichrado  : 1965  MRN: [id number]  Date of Service: 2024                 Patient Diagnosis(es): The primary encounter diagnosis was Altered mental status, unspecified altered mental status type. Diagnoses of SIRS (systemic inflammatory response syndrome) (HCC), Fever, unknown origin, and Acute on chronic respiratory failure with hypoxia (HCC) were also pertinent to this visit.  Past Medical History:  has a past medical history of COVID-19, Hyperlipidemia, MR (mental retardation), moderate, Seizures (HCC), and Thyroid disease.  Past Surgical History:  has a past surgical history that includes Endometrial ablation; other surgical history; and Dental surgery (N/A, 2022).     Requires PT Follow-Up: Yes     Evaluating Therapist: Zena Lebron PT     Referring Provider:      Vicky Zhu APRN - CNP       PT order : PT eval and treat     Room #: 643  DIAGNOSIS: The primary encounter diagnosis was Altered mental status, unspecified altered mental status type. Diagnoses of SIRS (systemic inflammatory response syndrome) (HCC), Fever, unknown origin, and Acute on chronic respiratory failure with hypoxia (HCC) were also pertinent to this visit.  Additional Pertinent History : MR   PRECAUTIONS:  falls     Social:  Pt lives at a group home   Prior to admission : pt reports she does not walk, but transfers to a w/c      Initial Evaluation  Date:  4/15/2024  Treatment      Short Term/ Long Term   Goals   Was pt agreeable to Eval/treatment?  Yes      Does pt have pain?  None reported      Bed Mobility  Rolling:  NT   Supine to sit:  mod assist   Sit to supine:  NT   Scooting:  min assist in sit    CGA    Transfers Sit to stand:  mod assist   Stand to sit: mod assist   Stand pivot: mod assist    CGA    Ambulation     A few steps with HHA mod assist    5-10  feet with  HHA or AAD  with  CGA

## 2024-04-16 NOTE — PROGRESS NOTES
Comprehensive Nutrition Assessment    Type and Reason for Visit:  Initial, Positive Nutrition Screen    Nutrition Recommendations/Plan:   Recommend soft and bite sized diet (home diet)  Will continue to monitor while inpatient.        Nutrition Assessment:    Pt w/ acute on chronic respiratory failure w/ hypoxia, AMS. Hx MR. Pt nonverbal. Pt from [facility], receives soft and bite sized diet there. Recommend soft and bite sized diet. Will continue to monitor.    Nutrition Related Findings:    A&O x1, AMS, abd distended/obese, hypo BS, generalized trace edema, I/O WDL, nasal cannula 3L Wound Type: None       Current Nutrition Intake & Therapies:    Average Meal Intake: % (per tray observation)  Average Supplements Intake: None Ordered  ADULT DIET; Regular    Anthropometric Measures:  Height: 154.9 cm (5' 1\")  Ideal Body Weight (IBW): 105 lbs (48 kg)    Admission Body Weight: 93.9 kg (207 lb) (4/15 stated)  Current Body Weight: 93.9 kg (207 lb) (4/15), 197.1 % IBW. Weight Source: Stated  Current BMI (kg/m2): 39.1  Usual Body Weight:  (no wt hx in EMR <1 yr)     Weight Adjustment For: No Adjustment                 BMI Categories: Obese Class 2 (BMI 35.0 -39.9)      Nutrition Diagnosis:   No nutrition diagnosis at this time     Nutrition Interventions:   Food and/or Nutrient Delivery: Continue Current Diet (recommend modify diet to soft and bite sized)  Nutrition Education/Counseling: Education not indicated  Coordination of Nutrition Care: Continue to monitor while inpatient       Goals:     Goals: by next RD assessment, PO intake 75% or greater       Nutrition Monitoring and Evaluation:   Behavioral-Environmental Outcomes: None Identified  Food/Nutrient Intake Outcomes: Food and Nutrient Intake  Physical Signs/Symptoms Outcomes: Biochemical Data, GI Status, Fluid Status or Edema, Nutrition Focused Physical Findings, Skin, Weight    Discharge Planning:    Too soon to determine     CHRISTIANO ARNOLD, MPH, RD,

## 2024-04-16 NOTE — PROGRESS NOTES
[facility] Quality Flow/Interdisciplinary Rounds Progress Note        Quality Flow Rounds held on April 16, 2024    Disciplines Attending:  Bedside Nurse, , , and Nursing Unit Leadership    Jessie Pichardo was admitted on 4/15/2024  8:28 AM    Anticipated Discharge Date:  Expected Discharge Date: 04/19/24    Disposition:    Reinaldo Score:  Reinaldo Scale Score: 18    Readmission Risk              Risk of Unplanned Readmission:  12           Discussed patient goal for the day, patient clinical progression, and barriers to discharge.  The following Goal(s) of the Day/Commitment(s) have been identified:   iv fludis, iv abx, await LP,       Cindi Scott RN  April 16, 2024

## 2024-04-16 NOTE — PROGRESS NOTES
Message left with ID to confirm or deny the need for IR procedure / spinal tap today. Awaiting call back.    Per Dr. Arndt, no need for IR procedure spinal tap. Order d/c

## 2024-04-16 NOTE — PROGRESS NOTES
While rounding this  found the patient to be asleep, did not disturb. Offered prayer for the patient and left a prayer card in the room.  remains available for support.

## 2024-04-16 NOTE — PROGRESS NOTES
Occupational Therapy    OCCUPATIONAL THERAPY INITIAL EVALUATION    [facility]   [address], [place]         Date:2024                                                  Patient Name: Jessie Pichardo    MRN: [id number]    : 1965    Room: [address] [place] [address]      Evaluating OT: Airam Ma OTR/L   [id number]      Referring Provider:Vicky Zhu APRN - CNP     Specific Provider Orders/Date:OT eval and treat 2024      Diagnosis:  AMS (altered mental status) [R41.82]     Pertinent Medical History: MR, seizures,      Precautions:  Fall Risk, O2     Assessment of current deficits    [x] Functional mobility  [x]ADLs  [x] Strength               [x]Cognition    [x] Functional transfers   [x] IADLs         [x] Safety Awareness   [x]Endurance    [] Fine Coordination              [x] Balance      [] Vision/perception   []Sensation     []Gross Motor Coordination  [] ROM  [] Delirium                   [] Motor Control     OT PLAN OF CARE   OT POC based on physician orders, patient diagnosis and results of clinical assessment    Frequency/Duration  1-3 days/wk for 2 - 4weeks PRN   Specific OT Treatment Interventions to include:   ADL retraining/adapted techniques and AE recommendations to increase functional independence within precautions                    Energy conservation techniques to improve tolerance for selfcare routine   Functional transfer/mobility training/DME recommendations for increased independence, safety and fall prevention         Patient/family education to increase safety and functional independence             Environmental modifications for safe mobility and completion of ADLs                             Therapeutic activity to improve functional performance during ADLs.                                         Therapeutic exercise to improve tolerance and functional strength for ADLs    Balance retraining/tolerance tasks for  facilitation of postural control with dynamic challenges during ADLs .      Positioning to improve functional independence       Recommended Adaptive Equipment: TBD     Home Living: Pt lives at group home, - reports she uses a wheelchair mobility.  Assist with xfers      Pain Level: no pain ;   Cognition: A&O to person    Memory:  fair    Sequencing:  fair   Problem solving:  fair   Judgement/safety:  fair      Functional Assessment:  AM-PAC Daily Activity Raw Score: 13/24   Initial Eval Status  Date: 4/16/2024 Treatment Status  Date: STGs = LTGs  Time frame: 10-14 days   Feeding Set-up   Supervision    Grooming Min A, seated   SBA    UB Dressing Mod A   Min A   LB Dressing Max A   Mod  A    Bathing Max A   Mod A   Toileting Max A   Min A    Bed Mobility  Mod A  Supine to sit   CGA    Functional Transfers Mod A  Sit-stand from bed   CGA    Functional Mobility PTA: patient reports she uses a wheelchair   Min A at wheelchair leve l  with good tolerance    Balance Sitting:     Static:  SBA- EOB     Dynamic:Min A   Standing: Mod A   Independent/supervision - sitting   Min/CGA- standing    Activity Tolerance No SOB observed   O2 on   Good  with ADL activity    Visual/  Perceptual Glasses: none by bedside         UE ROM/strength  AROM present throughout        Hand Dominance right    Hearing: WFL   Sensation:  No c/o numbness or tingling   Tone: WFL   Edema: none observed     Comments: Upon arrival patient lying in bed .  At end of session, patient sitting in chair  with call light and phone within reach, all lines and tubes intact.  *ALARM ON     Overall patient demonstrated  decreased independence and safety during completion of ADL/functional transfer/mobility tasks.  Pt would benefit from continued skilled OT to increase safety and independence with completion of ADL/IADL tasks for functional independence and quality of life.      Rehab Potential: limited for established goals     Patient / Family Goal: none stated

## 2024-04-16 NOTE — CARE COORDINATION
4/16/2024  Social Work Discharge Planning: Possible pneumonia. IV ATB. AMS. Pt is from the [facility]-[phone]-call Magda at this number for nurse to nurse at discharge. Pt will need transported by ambulance-form is in chart. Nurse Magda said transport would need to be between the hours of 5:30am and 11pm. Pt is on 4l o2 here and uses 2l at HS only. Pt uses a wc at the [facility]. Pt is nonverbal. Guardian Darling Aguilar was notified of admission-notify when Pt discharges as well. Electronically signed by KENNETH Solis on 4/16/2024 at 12:07 PM

## 2024-04-16 NOTE — PROGRESS NOTES
Infectious Disease  Progress Note  [facility]    Chief Complaint: sepsis    Subjective:  She is sitting in chair. Eating lunch. Says she feels better. No fever overnight. No neck pain or headache.     Scheduled Meds:   allopurinol  300 mg Oral Daily    asenapine maleate  10 mg SubLINGual Nightly    atorvastatin  40 mg Oral Daily    cetirizine  10 mg Oral Daily    dextromethorphan-quiNIDine  1 capsule Oral Daily    docusate sodium  200 mg Oral Daily    fenofibrate  160 mg Oral Daily    [Held by provider] levETIRAcetam  1,500 mg Oral BID    levothyroxine  100 mcg Oral Daily    polyethylene glycol  17 g Oral Daily    QUEtiapine  300 mg Oral Daily    rufinamide  1,600 mg Oral BID    sodium chloride flush  5-40 mL IntraVENous 2 times per day    cefepime  2,000 mg IntraVENous Q12H    [Held by provider] lacosamide  300 mg Oral Nightly    [Held by provider] lacosamide  200 mg Oral QAM    lacosamide (VIMPAT) 200 mg in sodium chloride 0.9 % 70 mL IVPB  200 mg IntraVENous QAM    levETIRAcetam  1,500 mg IntraVENous Q12H    lacosamide (VIMPAT) 300 mg in sodium chloride 0.9 % 80 mL IVPB  300 mg IntraVENous Nightly     Continuous Infusions:   sodium chloride       PRN Meds:sodium chloride flush, sodium chloride, potassium chloride **OR** potassium alternative oral replacement **OR** potassium chloride, magnesium sulfate, ondansetron **OR** ondansetron, polyethylene glycol, acetaminophen **OR** acetaminophen    Prior to Admission medications    Medication Sig Start Date End Date Taking? Authorizing Provider   polyethylene glycol (GLYCOLAX) 17 g packet Take 1 packet by mouth daily   Yes ProviderAn MD   fenofibrate (TRIGLIDE) 160 MG tablet Take 1 tablet by mouth daily   Yes ProviderAn MD   nystatin (MYCOSTATIN) 383733 UNIT/GM powder Apply topically 4 times daily Apply topically 4 times daily to waistline   Yes ProviderAn MD   XCOPRI 100 MG TABS TAKE 1 TABLET BY MOUTH ONCE DAILY AT BED TIME ALONG WITH

## 2024-04-16 NOTE — H&P
Ampicillin    Social History:    reports that she has never smoked. She has never used smokeless tobacco. She reports that she does not drink alcohol and does not use drugs.    Family History:   family history is not on file.      PHYSICAL EXAM:    Vitals:  BP (!) 106/58   Pulse 83   Temp 98.8 °F (37.1 °C) (Oral)   Resp 18   Ht 1.549 m (5' 1\")   Wt 93.9 kg (207 lb)   LMP 09/06/2012   SpO2 93%   BMI 39.11 kg/m²       General appearance: NAD, conversant, baseline cognitive impairment  Eyes: Sclerae anicteric, PERRLA  HEENT: AT/NC, MMM  Neck: FROM, supple, no thyromegaly  Lymph: No cervical / supraclavicular lymphadenopathy  Lungs: Clear to auscultation, WOB normal  CV: RRR, no MRGs, no lower extremity edema  Abdomen: Soft, non-tender; no masses or HSM, +BS  Extremities: FROM without synovitis.  No clubbing or cyanosis of the hands.  Skin: no rash, induration, lesions, or ulcers  Psych: Calm and cooperative.  Normal judgement and insight.  Normal mood and affect.  Neuro: Alert and interactive, face symmetric, speech fluent.    LABS:  All labs reviewed.  Of note:  CBC with Differential:    Lab Results   Component Value Date/Time    WBC 7.8 04/15/2024 08:42 AM    RBC 4.36 04/15/2024 08:42 AM    HGB 13.4 04/15/2024 08:42 AM    HCT 42.9 04/15/2024 08:42 AM     04/15/2024 08:42 AM    MCV 98.4 04/15/2024 08:42 AM    MCH 30.7 04/15/2024 08:42 AM    MCHC 31.2 04/15/2024 08:42 AM    RDW 14.8 04/15/2024 08:42 AM    NRBC 0.0 02/20/2024 04:10 AM    SEGSPCT 58 09/16/2012 05:15 PM    LYMPHOPCT 19 04/15/2024 08:42 AM    LYMPHOPCT 41.3 02/20/2024 04:10 AM    MONOPCT 6 04/15/2024 08:42 AM    BASOPCT 0 04/15/2024 08:42 AM    MONOSABS 0.48 04/15/2024 08:42 AM    LYMPHSABS 1.49 04/15/2024 08:42 AM    EOSABS 0.01 04/15/2024 08:42 AM    BASOSABS 0.02 04/15/2024 08:42 AM     CMP:    Lab Results   Component Value Date/Time     04/15/2024 08:42 AM    K 3.9 04/15/2024 08:42 AM     04/15/2024 08:42 AM    CO2 24  04/15/2024 08:42 AM    BUN 21 04/15/2024 08:42 AM    CREATININE 0.8 04/15/2024 08:42 AM    GFRAA > 60 02/20/2024 04:10 AM    LABGLOM >90 04/15/2024 08:42 AM    GLUCOSE 121 04/15/2024 08:42 AM    GLUCOSE 90 02/20/2024 04:10 AM    PROT 7.2 04/15/2024 08:42 AM    LABALBU 3.9 04/15/2024 08:42 AM    LABALBU 3.4 02/20/2024 04:10 AM    CALCIUM 8.4 04/15/2024 08:42 AM    BILITOT <0.2 04/15/2024 08:42 AM    BILITOT Negative 12/12/2022 05:30 PM    ALKPHOS 58 04/15/2024 08:42 AM    AST 36 04/15/2024 08:42 AM    ALT 27 04/15/2024 08:42 AM       Imaging:  CT head:  No intra cranial abnormality.    CXR: No acute process.    CTA pulmonary: There is no PE.  Patchy bilateral lobe airspace disease favored to represent atelectasis.  Pneumonia cannot be excluded in the proper clinical setting.  Hepatic steatosis.    CT abdomen pelvis: The gallbladder is distended/hydropic.  Redundant colon with a moderate amount of retained stool and gaseous distention of the cecum.  Ill-defined thick linear opacities are noted in the lower lobes of the lungs as well as slightly more confluent dependent opacities.  This most likely represents atelectasis.    Ultrasound gallbladder acute: Mildly distended gallbladder without evidence of pericholecystic fluid wall thickening or stones.  Negative sonographic Rodriguez sign.  Mild hepatomegaly at 17.3 cm with diffuse fatty infiltration without evidence of intrahepatic biliary ductal dilatation.    EKG:  I've personally reviewed the patient's EKG:  NSR    Telemetry:  I've personally reviewed the patient's telemetry:      ASSESSMENT/PLAN:  Principal Problem:    AMS (altered mental status)  Resolved Problems:    * No resolved hospital problems. *    58-year-old female with a history of MR presents to the ED with complaints lethargy is admitted to telemetry unit with    AMS likely due to pneumonia  -Supplement O2 demands keeping oxygen saturation greater than 92%.  Patient currently on 2 L O2  -IV

## 2024-04-17 LAB
ACB COMPLEX DNA BLD POS QL NAA+NON-PROBE: NOT DETECTED
ANION GAP SERPL CALCULATED.3IONS-SCNC: 10 MMOL/L (ref 7–16)
B FRAGILIS DNA BLD POS QL NAA+NON-PROBE: NOT DETECTED
BASOPHILS # BLD: 0 K/UL (ref 0–0.2)
BASOPHILS NFR BLD: 0 % (ref 0–2)
BIOFIRE TEST COMMENT: ABNORMAL
BUN SERPL-MCNC: 12 MG/DL (ref 6–20)
C ALBICANS DNA BLD POS QL NAA+NON-PROBE: NOT DETECTED
C AURIS DNA BLD POS QL NAA+NON-PROBE: NOT DETECTED
C GATTII+NEOFOR DNA BLD POS QL NAA+N-PRB: NOT DETECTED
C GLABRATA DNA BLD POS QL NAA+NON-PROBE: NOT DETECTED
C KRUSEI DNA BLD POS QL NAA+NON-PROBE: NOT DETECTED
C PARAP DNA BLD POS QL NAA+NON-PROBE: NOT DETECTED
C TROPICLS DNA BLD POS QL NAA+NON-PROBE: NOT DETECTED
CALCIUM SERPL-MCNC: 8.1 MG/DL (ref 8.6–10.2)
CHLORIDE SERPL-SCNC: 109 MMOL/L (ref 98–107)
CO2 SERPL-SCNC: 25 MMOL/L (ref 22–29)
CREAT SERPL-MCNC: 0.5 MG/DL (ref 0.5–1)
E CLOAC COMP DNA BLD POS NAA+NON-PROBE: NOT DETECTED
E COLI DNA BLD POS QL NAA+NON-PROBE: NOT DETECTED
E FAECALIS DNA BLD POS QL NAA+NON-PROBE: NOT DETECTED
E FAECIUM DNA BLD POS QL NAA+NON-PROBE: NOT DETECTED
ENTEROBACTERALES DNA BLD POS NAA+N-PRB: NOT DETECTED
EOSINOPHIL # BLD: 0.26 K/UL (ref 0.05–0.5)
EOSINOPHILS RELATIVE PERCENT: 4 % (ref 0–6)
ERYTHROCYTE [DISTWIDTH] IN BLOOD BY AUTOMATED COUNT: 14.5 % (ref 11.5–15)
GFR SERPL CREATININE-BSD FRML MDRD: >90 ML/MIN/1.73M2
GLUCOSE SERPL-MCNC: 119 MG/DL (ref 74–99)
GP B STREP DNA BLD POS QL NAA+NON-PROBE: NOT DETECTED
HAEM INFLU DNA BLD POS QL NAA+NON-PROBE: NOT DETECTED
HCT VFR BLD AUTO: 36.2 % (ref 34–48)
HGB BLD-MCNC: 11.3 G/DL (ref 11.5–15.5)
K OXYTOCA DNA BLD POS QL NAA+NON-PROBE: NOT DETECTED
KLEBSIELLA SP DNA BLD POS QL NAA+NON-PRB: NOT DETECTED
KLEBSIELLA SP DNA BLD POS QL NAA+NON-PRB: NOT DETECTED
L MONOCYTOG DNA BLD POS QL NAA+NON-PROBE: NOT DETECTED
LYMPHOCYTES NFR BLD: 1.74 K/UL (ref 1.5–4)
LYMPHOCYTES RELATIVE PERCENT: 29 % (ref 20–42)
MCH RBC QN AUTO: 30.8 PG (ref 26–35)
MCHC RBC AUTO-ENTMCNC: 31.2 G/DL (ref 32–34.5)
MCV RBC AUTO: 98.6 FL (ref 80–99.9)
MICROORGANISM SPEC CULT: ABNORMAL
MICROORGANISM SPEC CULT: NORMAL
MICROORGANISM/AGENT SPEC: ABNORMAL
MONOCYTES NFR BLD: 0.26 K/UL (ref 0.1–0.95)
MONOCYTES NFR BLD: 4 % (ref 2–12)
N MEN DNA BLD POS QL NAA+NON-PROBE: NOT DETECTED
NEUTROPHILS NFR BLD: 62 % (ref 43–80)
NEUTS SEG NFR BLD: 3.74 K/UL (ref 1.8–7.3)
P AERUGINOSA DNA BLD POS NAA+NON-PROBE: NOT DETECTED
PLATELET # BLD AUTO: 208 K/UL (ref 130–450)
PMV BLD AUTO: 9.9 FL (ref 7–12)
POTASSIUM SERPL-SCNC: 3.8 MMOL/L (ref 3.5–5)
PROTEUS SP DNA BLD POS QL NAA+NON-PROBE: NOT DETECTED
RBC # BLD AUTO: 3.67 M/UL (ref 3.5–5.5)
RBC # BLD: NORMAL 10*6/UL
S AUREUS DNA BLD POS QL NAA+NON-PROBE: NOT DETECTED
S AUREUS+CONS DNA BLD POS NAA+NON-PROBE: NOT DETECTED
S EPIDERMIDIS DNA BLD POS QL NAA+NON-PRB: NOT DETECTED
S LUGDUNENSIS DNA BLD POS QL NAA+NON-PRB: NOT DETECTED
S MALTOPHILIA DNA BLD POS QL NAA+NON-PRB: NOT DETECTED
S MARCESCENS DNA BLD POS NAA+NON-PROBE: NOT DETECTED
S PNEUM DNA BLD POS QL NAA+NON-PROBE: NOT DETECTED
S PYO DNA BLD POS QL NAA+NON-PROBE: NOT DETECTED
SALMONELLA DNA BLD POS QL NAA+NON-PROBE: NOT DETECTED
SERVICE CMNT-IMP: ABNORMAL
SODIUM SERPL-SCNC: 144 MMOL/L (ref 132–146)
SPECIMEN DESCRIPTION: ABNORMAL
SPECIMEN DESCRIPTION: NORMAL
STREPTOCOCCUS DNA BLD POS NAA+NON-PROBE: DETECTED
WBC OTHER # BLD: 6 K/UL (ref 4.5–11.5)

## 2024-04-17 PROCEDURE — 2700000000 HC OXYGEN THERAPY PER DAY

## 2024-04-17 PROCEDURE — 6360000002 HC RX W HCPCS: Performed by: STUDENT IN AN ORGANIZED HEALTH CARE EDUCATION/TRAINING PROGRAM

## 2024-04-17 PROCEDURE — 2580000003 HC RX 258

## 2024-04-17 PROCEDURE — 6360000002 HC RX W HCPCS

## 2024-04-17 PROCEDURE — 85025 COMPLETE CBC W/AUTO DIFF WBC: CPT

## 2024-04-17 PROCEDURE — 2580000003 HC RX 258: Performed by: NURSE PRACTITIONER

## 2024-04-17 PROCEDURE — 97535 SELF CARE MNGMENT TRAINING: CPT

## 2024-04-17 PROCEDURE — 2060000000 HC ICU INTERMEDIATE R&B

## 2024-04-17 PROCEDURE — C9254 INJECTION, LACOSAMIDE: HCPCS

## 2024-04-17 PROCEDURE — 97530 THERAPEUTIC ACTIVITIES: CPT

## 2024-04-17 PROCEDURE — 36415 COLL VENOUS BLD VENIPUNCTURE: CPT

## 2024-04-17 PROCEDURE — 2500000003 HC RX 250 WO HCPCS: Performed by: FAMILY MEDICINE

## 2024-04-17 PROCEDURE — 6370000000 HC RX 637 (ALT 250 FOR IP): Performed by: NURSE PRACTITIONER

## 2024-04-17 PROCEDURE — 2580000003 HC RX 258: Performed by: STUDENT IN AN ORGANIZED HEALTH CARE EDUCATION/TRAINING PROGRAM

## 2024-04-17 PROCEDURE — 80048 BASIC METABOLIC PNL TOTAL CA: CPT

## 2024-04-17 RX ORDER — CALCIUM GLUCONATE 20 MG/ML
1000 INJECTION, SOLUTION INTRAVENOUS ONCE
Status: COMPLETED | OUTPATIENT
Start: 2024-04-17 | End: 2024-04-17

## 2024-04-17 RX ORDER — CALCIUM GLUCONATE 94 MG/ML
1000 INJECTION, SOLUTION INTRAVENOUS ONCE
Status: DISCONTINUED | OUTPATIENT
Start: 2024-04-17 | End: 2024-04-17 | Stop reason: SDUPTHER

## 2024-04-17 RX ADMIN — SODIUM CHLORIDE, PRESERVATIVE FREE 10 ML: 5 INJECTION INTRAVENOUS at 11:27

## 2024-04-17 RX ADMIN — LACOSAMIDE 300 MG: 10 INJECTION INTRAVENOUS at 21:42

## 2024-04-17 RX ADMIN — CALCIUM GLUCONATE 1000 MG: 20 INJECTION, SOLUTION INTRAVENOUS at 10:51

## 2024-04-17 RX ADMIN — CEFEPIME 2000 MG: 2 INJECTION, POWDER, FOR SOLUTION INTRAVENOUS at 11:22

## 2024-04-17 RX ADMIN — QUETIAPINE FUMARATE 300 MG: 100 TABLET ORAL at 10:41

## 2024-04-17 RX ADMIN — ALLOPURINOL 300 MG: 300 TABLET ORAL at 10:41

## 2024-04-17 RX ADMIN — LACOSAMIDE 200 MG: 10 INJECTION INTRAVENOUS at 14:37

## 2024-04-17 RX ADMIN — DOCUSATE SODIUM 200 MG: 100 CAPSULE, LIQUID FILLED ORAL at 10:41

## 2024-04-17 RX ADMIN — SILVER SULFADIAZINE: 10 CREAM TOPICAL at 15:13

## 2024-04-17 RX ADMIN — CETIRIZINE HYDROCHLORIDE 10 MG: 10 TABLET, FILM COATED ORAL at 10:48

## 2024-04-17 RX ADMIN — FENOFIBRATE 160 MG: 160 TABLET ORAL at 10:41

## 2024-04-17 RX ADMIN — LEVOTHYROXINE SODIUM 100 MCG: 100 TABLET ORAL at 05:19

## 2024-04-17 RX ADMIN — ATORVASTATIN CALCIUM 40 MG: 40 TABLET, FILM COATED ORAL at 10:42

## 2024-04-17 RX ADMIN — LEVETIRACETAM 1500 MG: 100 INJECTION, SOLUTION INTRAVENOUS at 10:45

## 2024-04-17 NOTE — PROGRESS NOTES
Patient was up in chair with chair alarm in place. Chair alarm sounded and 2 Rns immediately went into the room and found the patient on the floor. Patient reports sliding out of the chair and onto the floor. No obvious sings of injuries. Patient reports she did not hit her head. She is not complaining of any pain at this time. Dr. Mello notified via perfect serve. Call to Legal Guardian Darling to notify her of fall.

## 2024-04-17 NOTE — PROGRESS NOTES
Physician Progress Note      PATIENT:               DAO COWART  CSN #:                  [id number]  :                       1965  ADMIT DATE:       4/15/2024 8:28 AM  DISCH DATE:  RESPONDING  PROVIDER #:        Joana Mello DO          QUERY TEXT:    Pt admitted with altered mental status and hypoxia. Pt noted to have   pneumonia. If possible, please document in the progress notes and discharge   summary if you are evaluating and / or treating any of the following:      The medical record reflects the following:  Risk Factors: Pneumonia  Clinical Indicators: H&P \"...Altered Mental Status...Patient presented to the   ED with complaints of altered mental status and hypoxia.  Patient chronically   is on 2 L of O2 however at the facility she was requiring 4 L...imaging   reveals pneumonia...AMS likely due to pneumonia...\", oxygen saturations 92-95%   on 3L  Treatment: 1L NS bolus, IV Abx, IVF, labs, ID consult    Thank you,  BRENDA LawrenceN, RN, [facility]  [phone]  Options provided:  -- Metabolic encephalopathy  -- Other - I will add my own diagnosis  -- Disagree - Not applicable / Not valid  -- Disagree - Clinically unable to determine / Unknown  -- Refer to Clinical Documentation Reviewer    PROVIDER RESPONSE TEXT:    This patient has metabolic encephalopathy.    Query created by: Niraj Olsen on 2024 10:42 AM      Electronically signed by:  Joana Mello DO 2024 10:51 AM

## 2024-04-17 NOTE — PROGRESS NOTES
[facility]   Progress note      Subjective:    The patient is awake and alert.    No acute events overnight.    Denies chest pain, angina, SOB     Objective:    BP (!) 92/55   Pulse 62   Temp 98 °F (36.7 °C)   Resp 18   Ht 1.549 m (5' 1\")   Wt 93.9 kg (207 lb)   LMP 09/06/2012   SpO2 95%   BMI 39.11 kg/m²     No intake/output data recorded.  No intake/output data recorded.    General appearance: NAD, conversant, baseline cognitive impairment  Eyes: Sclerae anicteric, PERRLA  HEENT: AT/NC, MMM  Neck: FROM, supple, no thyromegaly  Lymph: No cervical / supraclavicular lymphadenopathy  Lungs: Clear to auscultation, WOB normal  CV: RRR, no MRGs, no lower extremity edema  Abdomen: Soft, non-tender; no masses or HSM, +BS  Extremities: FROM without synovitis.  No clubbing or cyanosis of the hands.  Skin: no rash, induration, lesions, or ulcers  Psych: Calm and cooperative.  Normal judgement and insight.  Normal mood and affect.  Neuro: Alert and interactive, face symmetric, speech fluent.     Recent Labs     04/15/24  0842 04/16/24  1005 04/17/24  0820   WBC 7.8 6.6 6.0   HGB 13.4 11.4* 11.3*   HCT 42.9 36.7 36.2    198 208       Recent Labs     04/15/24  0842 04/16/24  1005 04/17/24  0820    147* 144   K 3.9 4.0 3.8   * 113* 109*   CO2 24 22 25   BUN 21* 16 12   CREATININE 0.8 0.6 0.5   CALCIUM 8.4* 7.8* 8.1*       Assessment:    Principal Problem:    AMS (altered mental status)  Resolved Problems:    * No resolved hospital problems. *      Plan:    58-year-old female with a history of MR presents to the ED with complaints lethargy is admitted to telemetry unit with     AMS likely due to pneumonia  -Supplement O2 demands keeping oxygen saturation greater than 92%.  Patient currently on 2 L O2  -IV cefepime/vancomycin  ID following  -Farzana a  -Monitor labs hemoglobin.  Monitor electrolytes place as necessary  -Strep/Legionella  IV hydration  Patient did receive 1 dose of

## 2024-04-17 NOTE — PROGRESS NOTES
Infectious Disease  Progress Note  [facility]    Chief Complaint: sepsis    Subjective:  She is in bed says she feels better. No bowel movement today.     Scheduled Meds:   silver sulfADIAZINE   Topical Daily    allopurinol  300 mg Oral Daily    asenapine maleate  10 mg SubLINGual Nightly    atorvastatin  40 mg Oral Daily    cetirizine  10 mg Oral Daily    dextromethorphan-quiNIDine  1 capsule Oral Daily    docusate sodium  200 mg Oral Daily    fenofibrate  160 mg Oral Daily    [Held by provider] levETIRAcetam  1,500 mg Oral BID    levothyroxine  100 mcg Oral Daily    polyethylene glycol  17 g Oral Daily    QUEtiapine  300 mg Oral Daily    rufinamide  1,600 mg Oral BID    sodium chloride flush  5-40 mL IntraVENous 2 times per day    cefepime  2,000 mg IntraVENous Q12H    [Held by provider] lacosamide  300 mg Oral Nightly    [Held by provider] lacosamide  200 mg Oral QAM    lacosamide (VIMPAT) 200 mg in sodium chloride 0.9 % 70 mL IVPB  200 mg IntraVENous QAM    levETIRAcetam  1,500 mg IntraVENous Q12H    lacosamide (VIMPAT) 300 mg in sodium chloride 0.9 % 80 mL IVPB  300 mg IntraVENous Nightly     Continuous Infusions:   sodium chloride       PRN Meds:sodium chloride flush, sodium chloride, potassium chloride **OR** potassium alternative oral replacement **OR** potassium chloride, magnesium sulfate, ondansetron **OR** ondansetron, polyethylene glycol, acetaminophen **OR** acetaminophen    Prior to Admission medications    Medication Sig Start Date End Date Taking? Authorizing Provider   polyethylene glycol (GLYCOLAX) 17 g packet Take 1 packet by mouth daily   Yes Provider, MD An   fenofibrate (TRIGLIDE) 160 MG tablet Take 1 tablet by mouth daily   Yes Provider, Historical, MD   nystatin (MYCOSTATIN) 906158 UNIT/GM powder Apply topically 4 times daily Apply topically 4 times daily to waistline   Yes ProviderAn MD   XCOPRI 100 MG TABS TAKE 1 TABLET BY MOUTH ONCE DAILY AT BED TIME ALONG WITH 200MG

## 2024-04-17 NOTE — PROGRESS NOTES
Physical Therapy  Facility/Department: [address] INTERMEDIATE  Physical Therapy Initial Assessment    Name: Jessie Pichardo  : 1965  MRN: [id number]  Date of Service: 2024    Patient Diagnosis(es): The primary encounter diagnosis was Altered mental status, unspecified altered mental status type. Diagnoses of SIRS (systemic inflammatory response syndrome) (HCC), Fever, unknown origin, and Acute on chronic respiratory failure with hypoxia (HCC) were also pertinent to this visit.  Past Medical History:  has a past medical history of COVID-19, Hyperlipidemia, MR (mental retardation), moderate, Seizures (HCC), and Thyroid disease.  Past Surgical History:  has a past surgical history that includes Endometrial ablation; other surgical history; and Dental surgery (N/A, 2022).      Evaluating Therapist: Zena Lebron PT      Referring Provider:      Vicky Zhu APRN - CNP         PT order : PT eval and treat      Room #: 643  DIAGNOSIS: The primary encounter diagnosis was Altered mental status, unspecified altered mental status type. Diagnoses of SIRS (systemic inflammatory response syndrome) (HCC), Fever, unknown origin, and Acute on chronic respiratory failure with hypoxia (HCC) were also pertinent to this visit.  Additional Pertinent History : MR   PRECAUTIONS:  falls, impulsive     Social:  Pt lives at a group home   Prior to admission : pt reports she does not walk, but transfers to a w/c        Initial Evaluation  Date:  4/15/2024  Treatment      Short Term/ Long Term   Goals   Was pt agreeable to Eval/treatment?  Yes  yes      Does pt have pain?  None reported   none reported     Bed Mobility  Rolling:  NT   Supine to sit:  mod assist   Sit to supine:  NT   Scooting:  min assist in sit   rolling:  NT  Supine to sit:  Mod A  Sit to supine:  NT  Scooting:  Mod A to sitting EOB  CGA    Transfers Sit to stand:  mod assist   Stand to sit: mod assist   Stand pivot: mod assist   sit to stand:  Mod  A  Stand to sit:  Mod A  Stand pivot:  Mod A x 2 with HHA  CGA    Ambulation     A few steps with HHA mod assist   NT  5-10  feet with  HHA or AAD  with  CGA          Stair negotiation: ascended and descended NT    N/A    LE ROM  Grossly WFL        LE strength  Not formally tested        AM- PAC RAW score  10/24   10/24        Patient education  Pt educated on PT objectives during treatment session, safety during mobility.     Patient response to education:   Pt verbalized understanding Pt demonstrated skill Pt requires further education in this area       yes     ASSESSMENT:    Comments:  Pt found in bed and left sitting up in the chair with call light in reach and chair alarm on.  Nursing okayed pt for out of bed mobility.       Treatment:  Patient practiced and was instructed in the following treatment:   Functional mobility performed as documented above.  No report of dizziness during functional mobility.  Pt impulsive with all mobility and with unsteady balance when transferring into the chair.  Pt attempting to sit in the chair prior to being in alignment with the chair.        PLAN:    Patient is making  progress towards established goals.  Will continue with current POC.     Time in  0940  Time out  0948    Total Treatment Time  8 minutes     CPT codes:    [x] Therapeutic activities 25680 8 minutes  [] Therapeutic exercises 22784  minutes      Kanwal Alba, PT [id number]

## 2024-04-17 NOTE — PROGRESS NOTES
Occupational Therapy  OT BEDSIDE TREATMENT NOTE      Date:2024  Patient Name: Jessie Pichardo  MRN: [id number]  : 1965  Room: [address] [place], [place] [address]     Evaluating OT: Airam Ma OTR/L   [id number]       Referring Provider:Vicky Zhu APRN - CNP     Specific Provider Orders/Date:OT eval and treat 2024       Diagnosis:  AMS (altered mental status) [R41.82]     Pertinent Medical History: MR, seizures,      Precautions:  Fall Risk, O2, isolation       Assessment of current deficits    [x] Functional mobility            [x]ADLs           [x] Strength                  [x]Cognition    [x] Functional transfers          [x] IADLs         [x] Safety Awareness   [x]Endurance    [] Fine Coordination                         [x] Balance      [] Vision/perception   []Sensation      []Gross Motor Coordination             [] ROM           [] Delirium                   [] Motor Control      OT PLAN OF CARE   OT POC based on physician orders, patient diagnosis and results of clinical assessment     Frequency/Duration  1-3 days/wk for 2 - 4weeks PRN   Specific OT Treatment Interventions to include:   ADL retraining/adapted techniques and AE recommendations to increase functional independence within precautions                    Energy conservation techniques to improve tolerance for selfcare routine   Functional transfer/mobility training/DME recommendations for increased independence, safety and fall prevention         Patient/family education to increase safety and functional independence             Environmental modifications for safe mobility and completion of ADLs                             Therapeutic activity to improve functional performance during ADLs.                                         Therapeutic exercise to improve tolerance and functional strength for ADLs    Balance retraining/tolerance tasks for facilitation of postural control with dynamic challenges during ADLs .      Positioning to improve

## 2024-04-18 VITALS
TEMPERATURE: 98 F | HEIGHT: 61 IN | OXYGEN SATURATION: 95 % | SYSTOLIC BLOOD PRESSURE: 138 MMHG | WEIGHT: 207 LBS | HEART RATE: 83 BPM | BODY MASS INDEX: 39.08 KG/M2 | DIASTOLIC BLOOD PRESSURE: 73 MMHG | RESPIRATION RATE: 20 BRPM

## 2024-04-18 LAB
ANION GAP SERPL CALCULATED.3IONS-SCNC: 8 MMOL/L (ref 7–16)
BASOPHILS # BLD: 0.02 K/UL (ref 0–0.2)
BASOPHILS NFR BLD: 0 % (ref 0–2)
BUN SERPL-MCNC: 9 MG/DL (ref 6–20)
CALCIUM SERPL-MCNC: 8.2 MG/DL (ref 8.6–10.2)
CHLORIDE SERPL-SCNC: 108 MMOL/L (ref 98–107)
CO2 SERPL-SCNC: 26 MMOL/L (ref 22–29)
CREAT SERPL-MCNC: 0.5 MG/DL (ref 0.5–1)
EOSINOPHIL # BLD: 0.21 K/UL (ref 0.05–0.5)
EOSINOPHILS RELATIVE PERCENT: 4 % (ref 0–6)
ERYTHROCYTE [DISTWIDTH] IN BLOOD BY AUTOMATED COUNT: 14.2 % (ref 11.5–15)
GFR SERPL CREATININE-BSD FRML MDRD: >90 ML/MIN/1.73M2
GLUCOSE SERPL-MCNC: 99 MG/DL (ref 74–99)
HCT VFR BLD AUTO: 36 % (ref 34–48)
HGB BLD-MCNC: 11.3 G/DL (ref 11.5–15.5)
IMM GRANULOCYTES # BLD AUTO: 0.03 K/UL (ref 0–0.58)
IMM GRANULOCYTES NFR BLD: 1 % (ref 0–5)
LYMPHOCYTES NFR BLD: 2.08 K/UL (ref 1.5–4)
LYMPHOCYTES RELATIVE PERCENT: 35 % (ref 20–42)
MCH RBC QN AUTO: 30.8 PG (ref 26–35)
MCHC RBC AUTO-ENTMCNC: 31.4 G/DL (ref 32–34.5)
MCV RBC AUTO: 98.1 FL (ref 80–99.9)
MONOCYTES NFR BLD: 0.47 K/UL (ref 0.1–0.95)
MONOCYTES NFR BLD: 8 % (ref 2–12)
NEUTROPHILS NFR BLD: 53 % (ref 43–80)
NEUTS SEG NFR BLD: 3.13 K/UL (ref 1.8–7.3)
PLATELET # BLD AUTO: 201 K/UL (ref 130–450)
PMV BLD AUTO: 9.8 FL (ref 7–12)
POTASSIUM SERPL-SCNC: 3.9 MMOL/L (ref 3.5–5)
RBC # BLD AUTO: 3.67 M/UL (ref 3.5–5.5)
SODIUM SERPL-SCNC: 142 MMOL/L (ref 132–146)
WBC OTHER # BLD: 5.9 K/UL (ref 4.5–11.5)

## 2024-04-18 PROCEDURE — 6360000002 HC RX W HCPCS: Performed by: STUDENT IN AN ORGANIZED HEALTH CARE EDUCATION/TRAINING PROGRAM

## 2024-04-18 PROCEDURE — C9254 INJECTION, LACOSAMIDE: HCPCS

## 2024-04-18 PROCEDURE — 97530 THERAPEUTIC ACTIVITIES: CPT

## 2024-04-18 PROCEDURE — 2580000003 HC RX 258: Performed by: STUDENT IN AN ORGANIZED HEALTH CARE EDUCATION/TRAINING PROGRAM

## 2024-04-18 PROCEDURE — 2580000003 HC RX 258

## 2024-04-18 PROCEDURE — 6360000002 HC RX W HCPCS

## 2024-04-18 PROCEDURE — 85025 COMPLETE CBC W/AUTO DIFF WBC: CPT

## 2024-04-18 PROCEDURE — 36415 COLL VENOUS BLD VENIPUNCTURE: CPT

## 2024-04-18 PROCEDURE — 2700000000 HC OXYGEN THERAPY PER DAY

## 2024-04-18 PROCEDURE — 80048 BASIC METABOLIC PNL TOTAL CA: CPT

## 2024-04-18 PROCEDURE — 97535 SELF CARE MNGMENT TRAINING: CPT

## 2024-04-18 PROCEDURE — 2580000003 HC RX 258: Performed by: NURSE PRACTITIONER

## 2024-04-18 PROCEDURE — 6370000000 HC RX 637 (ALT 250 FOR IP): Performed by: NURSE PRACTITIONER

## 2024-04-18 RX ORDER — LACOSAMIDE 200 MG/1
200 TABLET ORAL SEE ADMIN INSTRUCTIONS
Qty: 62 TABLET | Refills: 5 | Status: SHIPPED | OUTPATIENT
Start: 2024-04-18 | End: 2024-07-17

## 2024-04-18 RX ORDER — LEVOFLOXACIN 500 MG/1
500 TABLET, FILM COATED ORAL DAILY
Qty: 3 TABLET | Refills: 0 | DISCHARGE
Start: 2024-04-18 | End: 2024-04-21

## 2024-04-18 RX ADMIN — LEVETIRACETAM 1500 MG: 100 INJECTION, SOLUTION INTRAVENOUS at 00:42

## 2024-04-18 RX ADMIN — POLYETHYLENE GLYCOL 3350 17 G: 17 POWDER, FOR SOLUTION ORAL at 09:02

## 2024-04-18 RX ADMIN — ATORVASTATIN CALCIUM 40 MG: 40 TABLET, FILM COATED ORAL at 09:03

## 2024-04-18 RX ADMIN — CEFEPIME 2000 MG: 2 INJECTION, POWDER, FOR SOLUTION INTRAVENOUS at 11:05

## 2024-04-18 RX ADMIN — LEVOTHYROXINE SODIUM 100 MCG: 100 TABLET ORAL at 06:59

## 2024-04-18 RX ADMIN — CETIRIZINE HYDROCHLORIDE 10 MG: 10 TABLET, FILM COATED ORAL at 09:03

## 2024-04-18 RX ADMIN — LACOSAMIDE 200 MG: 10 INJECTION INTRAVENOUS at 13:18

## 2024-04-18 RX ADMIN — SILVER SULFADIAZINE: 10 CREAM TOPICAL at 08:57

## 2024-04-18 RX ADMIN — ALLOPURINOL 300 MG: 300 TABLET ORAL at 09:03

## 2024-04-18 RX ADMIN — DOCUSATE SODIUM 200 MG: 100 CAPSULE, LIQUID FILLED ORAL at 09:03

## 2024-04-18 RX ADMIN — CEFEPIME 2000 MG: 2 INJECTION, POWDER, FOR SOLUTION INTRAVENOUS at 00:45

## 2024-04-18 RX ADMIN — SODIUM CHLORIDE, PRESERVATIVE FREE 10 ML: 5 INJECTION INTRAVENOUS at 09:03

## 2024-04-18 RX ADMIN — QUETIAPINE FUMARATE 300 MG: 100 TABLET ORAL at 09:03

## 2024-04-18 RX ADMIN — LEVETIRACETAM 1500 MG: 100 INJECTION, SOLUTION INTRAVENOUS at 11:04

## 2024-04-18 RX ADMIN — FENOFIBRATE 160 MG: 160 TABLET ORAL at 09:03

## 2024-04-18 ASSESSMENT — PAIN SCALES - GENERAL
PAINLEVEL_OUTOF10: 0
PAINLEVEL_OUTOF10: 0

## 2024-04-18 NOTE — PROGRESS NOTES
Nurse to nurse report called to [facility]. Spoke with nurse Hyatt. She states that they do not have nursing after 11 pm so pt has to be there prior to 11 pm otherwise she cannot come back until tomorrow.

## 2024-04-18 NOTE — PLAN OF CARE
Problem: ABCDS Injury Assessment  Goal: Absence of physical injury  Outcome: Progressing     Problem: Skin/Tissue Integrity  Goal: Absence of new skin breakdown  Description: 1.  Monitor for areas of redness and/or skin breakdown  2.  Assess vascular access sites hourly  3.  Every 4-6 hours minimum:  Change oxygen saturation probe site  4.  Every 4-6 hours:  If on nasal continuous positive airway pressure, respiratory therapy assess nares and determine need for appliance change or resting period.  Outcome: Progressing     Problem: Discharge Planning  Goal: Discharge to home or other facility with appropriate resources  Recent Flowsheet Documentation  Taken 4/18/2024 0117 by Bertin Ortega, RN  Discharge to home or other facility with appropriate resources: Identify barriers to discharge with patient and caregiver     Problem: Safety - Adult  Goal: Free from fall injury  Outcome: Progressing

## 2024-04-18 NOTE — PROGRESS NOTES
1/9/24   Beverly Meadows, APRN - CNP   asenapine maleate (SAPHRIS) 10 MG SUBL sublingual tablet DISSOLVE 1 TABLET UNDER THE TONGUE AT BED TIME 4/14/23   Lio Johnson,    ibuprofen (ADVIL;MOTRIN) 600 MG tablet Take 1 tablet by mouth every 6 hours as needed for Pain 2/25/22 3/2/22  Ai Tipton DDS   cetirizine (ZYRTEC) 10 MG tablet Take 1 tablet by mouth daily    An Manzanares MD   Multiple Vitamin (DAILY TOSHIA) TABS Take by mouth    An Manzanares MD   docusate sodium (COLACE) 100 MG capsule Take 2 capsules by mouth daily    An Manzanares MD   dextromethorphan-quiNIDine (NUEDEXTA) 20-10 MG CAPS per capsule Take 1 capsule by mouth daily    An Manzanares MD   nystatin-triamcinolone (MYCOLOG II) 399734-8.1 UNIT/GM-% cream Apply topically 4 times daily Apply topically to ab 4 times daily.    An Manzanares MD   Omega 3 1200 MG CAPS Take by mouth    An Manzanares MD   rufinamide (BANZEL) 400 MG tablet Take 4 tablets by mouth 2 times daily    An Manzanares MD   vitamin B-12 (CYANOCOBALAMIN) 1000 MCG tablet Take 1 tablet by mouth daily    An Manzanares MD   vitamin D (ERGOCALCIFEROL) 1.25 MG (97352 UT) CAPS capsule Take 1 capsule by mouth once a week Thursdays in PM    An Manzanares MD   acetaminophen (TYLENOL) 325 MG tablet Take 2 tablets by mouth every 4 hours as needed for Pain    An Manzanares MD   QUEtiapine (SEROQUEL) 200 MG tablet Take 1.5 tablets by mouth daily    An Manzanares MD   lacosamide (VIMPAT) 200 MG tablet Take 1 tablet by mouth 2 times daily.  Patient taking differently: Take 100 mg by mouth 2 times daily. 1.5 tabs=225mg  in AM   and 2 tabs=300mg in PM 5/28/14   Jayden Denton, APRN - CNS   levETIRAcetam (KEPPRA) 500 MG tablet Take 3 tablets by mouth 2 times daily. 2/14/14   Jayden Denton APRN - CNS   levothyroxine (SYNTHROID) 50 MCG tablet Take 2 tablets by mouth daily    Provider, MD An    atorvastatin (LIPITOR) 40 MG tablet Take 1 tablet by mouth daily    ProviderAn MD   calcium-vitamin D (OSCAL-500) 500-200 MG-UNIT per tablet Take 1 tablet by mouth 2 times daily    An Manzanares MD   magnesium hydroxide (MILK OF MAGNESIA) 400 MG/5ML suspension Take  by mouth daily as needed.      Provider, MD An   allopurinol (ZYLOPRIM) 300 MG tablet Take 1 tablet by mouth daily    ProviderAn MD        ROS:  As mentioned in subjective, all other systems negative      BP (!) 102/52   Pulse 79   Temp 98.2 °F (36.8 °C) (Oral)   Resp 20   Ht 1.549 m (5' 1\")   Wt 93.9 kg (207 lb)   LMP 09/06/2012   SpO2 94%   BMI 39.11 kg/m²     Physical Exam  Const/Neuro- NO signs of acute distress, Alert.   ENMT- Within Normal Limits, Normocephalic, mucous membranes pink/moist, No thrush  Neck: Neck supple  Heart- Regular, Rate, Rhythm- no murmur appreciated.  Lungs- clear to ascultation. Respirations even and nonlabored.  Abdomen- Soft, bowel sounds positive, non tender. Farr in place  Musculo/Extremities-  Equal and symmetrical, no edema. No tenderness.  Neurological- mentally challenged.   Skin:  Warm and dry, free from rashes.  Lines: PIV      Labs, Cultures reviewed  Radiology and other consultants notes reviewed    Microbiology:  Blood cx: one out of 2 GPC In chains. BCID showed Strep mitis/oralis    Assessment  Strep species bacteremia: likely contamination.  Possible pneumonia: has low grade fevers, lethargy.   Chronic resp failure on oxygen at facility:    Plan  Continue cefepime in patient.   Can be d/wilfredo on oral levaquin for 3 more days. Med rec done.     Monitor labs  Will follow with you.    Electronically signed by CESAR NANCE MD on 4/18/2024 at 1:09 PM

## 2024-04-18 NOTE — PROGRESS NOTES
Physical Therapy  Facility/Department: [address] INTERMEDIATE  Daily Treatment Note  NAME: Jessie Pichardo  : 1965  MRN: [id number]    Date of Service: 2024      Patient Diagnosis(es): The primary encounter diagnosis was Altered mental status, unspecified altered mental status type. Diagnoses of SIRS (systemic inflammatory response syndrome) (HCC), Fever, unknown origin, and Acute on chronic respiratory failure with hypoxia (HCC) were also pertinent to this visit.    Evaluating Therapist: Zena Lebron PT      Referring Provider:      Vicky Zhu APRN - CNP         PT order : PT eval and treat      Room #: 643  DIAGNOSIS: The primary encounter diagnosis was Altered mental status, unspecified altered mental status type. Diagnoses of SIRS (systemic inflammatory response syndrome) (HCC), Fever, unknown origin, and Acute on chronic respiratory failure with hypoxia (HCC) were also pertinent to this visit.  Additional Pertinent History : MR   PRECAUTIONS:  falls, impulsive     Social:  Pt lives at a group home   Prior to admission : pt reports she does not walk, but transfers to a w/c        Initial Evaluation  Date:  4/15/2024  Treatment      Short Term/ Long Term   Goals   Was pt agreeable to Eval/treatment?  Yes  yes      Does pt have pain?  None reported  Belly pain     Bed Mobility  Rolling:  NT   Supine to sit:  mod assist   Sit to supine:  NT   Scooting:  min assist in sit   rolling:  Min A  Supine to sit:  Mod A  Sit to supine:  NT  Scooting:  Mod A to sitting EOB  CGA    Transfers Sit to stand:  mod assist   Stand to sit: mod assist   Stand pivot: mod assist   sit to stand:  Mod A  Stand to sit:  Mod A  Stand pivot:  Mod A x 2 with HHA  CGA    Ambulation     A few steps with HHA mod assist   NT  5-10  feet with  HHA or AAD  with  CGA    Stair negotiation: ascended and descended NT    N/A    LE ROM  Grossly WFL        LE strength  Not formally tested        AM- PAC RAW score  10/24   10/24

## 2024-04-18 NOTE — PROGRESS NOTES
Occupational Therapy  OT BEDSIDE TREATMENT NOTE      Date:2024  Patient Name: Jessie Pichardo  MRN: [id number]  : 1965  Room: [address] [place], [place] [address]     Evaluating OT: Airam Ma OTR/L   [id number]       Referring Provider:Vicky Zhu APRN - CNP     Specific Provider Orders/Date:OT eval and treat 2024       Diagnosis:  AMS (altered mental status) [R41.82]     Pertinent Medical History: MR, seizures,      Precautions:  Fall Risk, O2, isolation       Assessment of current deficits    [x] Functional mobility            [x]ADLs           [x] Strength                  [x]Cognition    [x] Functional transfers          [x] IADLs         [x] Safety Awareness   [x]Endurance    [] Fine Coordination                         [x] Balance      [] Vision/perception   []Sensation      []Gross Motor Coordination             [] ROM           [] Delirium                   [] Motor Control      OT PLAN OF CARE   OT POC based on physician orders, patient diagnosis and results of clinical assessment     Frequency/Duration  1-3 days/wk for 2 - 4weeks PRN   Specific OT Treatment Interventions to include:   ADL retraining/adapted techniques and AE recommendations to increase functional independence within precautions                    Energy conservation techniques to improve tolerance for selfcare routine   Functional transfer/mobility training/DME recommendations for increased independence, safety and fall prevention         Patient/family education to increase safety and functional independence             Environmental modifications for safe mobility and completion of ADLs                             Therapeutic activity to improve functional performance during ADLs.                                         Therapeutic exercise to improve tolerance and functional strength for ADLs    Balance retraining/tolerance tasks for facilitation of postural control with dynamic challenges during ADLs .      Positioning to improve

## 2024-04-18 NOTE — DISCHARGE SUMMARY
[facility]   Discharge summary   Patient ID:  Jessie Pichardo  [id number]  58 y.o.  1965    Admit date: 4/15/2024    Discharge date and time: 4/18/2024    Admission Diagnoses:   Patient Active Problem List   Diagnosis    Seizure (HCC)    Dental caries    Periodontal disease    AMS (altered mental status)       Discharge Diagnoses: ***    Consults: {consultation:79910}    Procedures: ***    Hospital Course: The patient is a 58 y.o. female of Lio Johnson DO with significant past medical history of *** who presents with ***    Recent Labs     04/16/24  1005 04/17/24  0820 04/18/24  0636   WBC 6.6 6.0 5.9   HGB 11.4* 11.3* 11.3*   HCT 36.7 36.2 36.0    208 201       Recent Labs     04/16/24  1005 04/17/24  0820 04/18/24  0636   * 144 142   K 4.0 3.8 3.9   * 109* 108*   CO2 22 25 26   BUN 16 12 9   CREATININE 0.6 0.5 0.5   CALCIUM 7.8* 8.1* 8.2*       US GALLBLADDER RUQ    Result Date: 4/15/2024  EXAMINATION: RIGHT UPPER QUADRANT ULTRASOUND 4/15/2024 12:29 pm COMPARISON: None. HISTORY: ORDERING SYSTEM PROVIDED HISTORY: eval for cholecystitis TECHNOLOGIST PROVIDED HISTORY: Reason for exam:->eval for cholecystitis What reading provider will be dictating this exam?->CRC FINDINGS: LIVER:  Mild hepatomegaly at 17.3 cm with diffuse fatty infiltration and without evidence of intrahepatic biliary ductal dilatation. BILIARY SYSTEM:  Mildly distended gallbladder without evidence of pericholecystic fluid, wall thickening or stones.  Negative sonographic Rodriguez's sign. Common bile duct is within normal limits measuring 5.9 mm. RIGHT KIDNEY: The right kidney is grossly unremarkable without evidence of hydronephrosis. PANCREAS:  Hyperechoic parenchyma. OTHER: No evidence of right upper quadrant ascites.     1. Mildly distended gallbladder without evidence of pericholecystic fluid, wall thickening or stones. Negative sonographic Rodriguez's sign. 2. Mild hepatomegaly at 17.3 cm with diffuse  fatty infiltration and without evidence of intrahepatic biliary ductal dilatation.     CTA PULMONARY W CONTRAST    Result Date: 4/15/2024  EXAMINATION: CTA OF THE CHEST 4/15/2024 11:13 am TECHNIQUE: CTA of the chest was performed after the administration of intravenous contrast.  Multiplanar reformatted images are provided for review.  MIP images are provided for review. Automated exposure control, iterative reconstruction, and/or weight based adjustment of the mA/kV was utilized to reduce the radiation dose to as low as reasonably achievable. COMPARISON: None. HISTORY: ORDERING SYSTEM PROVIDED HISTORY: eval for PE TECHNOLOGIST PROVIDED HISTORY: Reason for exam:->eval for PE Additional Contrast?->1 FINDINGS: The images are degraded by respiratory motion artifact. Pulmonary Arteries: Pulmonary arteries are adequately opacified for evaluation.  No evidence of intraluminal filling defect to suggest pulmonary embolism.  Main pulmonary artery is normal in caliber. Mediastinum: No evidence of mediastinal lymphadenopathy.  The heart and pericardium demonstrate no acute abnormality.  There is no acute abnormality of the thoracic aorta. Lungs/pleura: There is patchy bilateral lower lobe airspace disease favored to represent atelectasis.  Pneumonia is less likely but cannot be excluded in the proper clinical setting.. Upper Abdomen: There is diffuse fatty infiltration of the liver.  No gross mass is noted.  There are no findings of ascites.. Soft Tissues/Bones:  Age related degenerative changes of the visualized osseous structures without focal destructive lesion.  .     1. There is no pulmonary embolus 2. Patchy bilateral lower lobe airspace disease favored to represent atelectasis.  Pneumonia cannot be excluded in the proper clinical setting. Images through the lungs are degraded secondary to respiratory motion artifact 3. Hepatic steatosis .     CT ABDOMEN PELVIS W IV CONTRAST Additional Contrast? None    Result Date:

## 2024-04-18 NOTE — PLAN OF CARE
Problem: ABCDS Injury Assessment  Goal: Absence of physical injury  Outcome: Progressing     Problem: Skin/Tissue Integrity  Goal: Absence of new skin breakdown  Description: 1.  Monitor for areas of redness and/or skin breakdown  2.  Assess vascular access sites hourly  3.  Every 4-6 hours minimum:  Change oxygen saturation probe site  4.  Every 4-6 hours:  If on nasal continuous positive airway pressure, respiratory therapy assess nares and determine need for appliance change or resting period.  Outcome: Progressing     Problem: Discharge Planning  Goal: Discharge to home or other facility with appropriate resources  Outcome: Progressing

## 2024-04-18 NOTE — DISCHARGE INSTR - COC
Continuity of Care Form    Patient Name: Jessie Pichardo   :  1965  MRN:  [id number]    Admit date:  4/15/2024  Discharge date:  24    Code Status Order: Full Code   Advance Directives:     Admitting Physician:  Joana Mello DO  PCP: Lio Johnson DO    Discharging Nurse: Bertin Ortega Rn  Discharging Hospital Unit/Room#: 0643/0643-A  Discharging Unit Phone Number: [phone]    Emergency Contact:   Extended Emergency Contact Information  Primary Emergency Contact: AlbertoroopaDarling-APSQUINN   [facility]  Home Phone: [phone]  Mobile Phone: [phone]  Relation: Other    Past Surgical History:  Past Surgical History:   Procedure Laterality Date    DENTAL SURGERY N/A 2022    EXAM, RADIOGRAPHS, ADULT PROPHYLAXIS, FLUORIDE, RESTORATIONS, EXTRACTION x1 performed by Ann Merlos DDS at [facility]    ENDOMETRIAL ABLATION      OTHER SURGICAL HISTORY      VAGAL NERVE STIMULATOR       Immunization History:   Immunization History   Administered Date(s) Administered    COVID-19, PFIZER PURPLE top, DILUTE for use, (age 12 y+), 30mcg/0.3mL 2021, 2021, 10/27/2021    TDaP, ADACEL (age 10y-64y), BOOSTRIX (age 10y+), IM, 0.5mL 05/15/2018       Active Problems:  Patient Active Problem List   Diagnosis Code    Seizure (HCC) R56.9    Dental caries K02.9    Periodontal disease K05.6    AMS (altered mental status) R41.82       Isolation/Infection:   Isolation            Contact          Patient Infection Status       Infection Onset Added Last Indicated Last Indicated By Review Planned Expiration Resolved Resolved By    MRSA 12/12/22 12/15/22 12/12/22 MRSA Screen        Resolved    COVID-19 10/29/21 11/01/21 10/29/21 COVID-19   21 Infection                        Nurse Assessment:  Last Vital Signs: BP (!) 102/52   Pulse 79   Temp 98.2 °F (36.8 °C) (Oral)   Resp 20   Ht 1.549 m (5' 1\")   Wt 93.9 kg (207 lb)   LMP 2012   SpO2 94%   BMI 39.11 kg/m²     Last  cannula.  Ventilator:    - No ventilator support    Rehab Therapies: Physical Therapy and Occupational Therapy  Weight Bearing Status/Restrictions: No weight bearing restrictions  Other Medical Equipment (for information only, NOT a DME order):  wheelchair  Other Treatments: ***    Patient's personal belongings (please select all that are sent with patient):  Stuffed duck    RN SIGNATURE:  Electronically signed by Bertin Ortega RN on 4/18/24 at 2:49 PM EDT    CASE MANAGEMENT/SOCIAL WORK SECTION    Inpatient Status Date: ***    Readmission Risk Assessment Score:  Readmission Risk              Risk of Unplanned Readmission:  17           Discharging to Facility/ Agency   Name:   Address:  Phone:  Fax:    Dialysis Facility (if applicable)   Name:  Address:  Dialysis Schedule:  Phone:  Fax:    / signature: {Esignature:[id number]}    PHYSICIAN SECTION    Prognosis: {Prognosis:4204436790}    Condition at Discharge: { Patient Condition:565926214}    Rehab Potential (if transferring to Rehab): {Prognosis:4820770001}    Recommended Labs or Other Treatments After Discharge: ***    Physician Certification: I certify the above information and transfer of Jessie Pichardo  is necessary for the continuing treatment of the diagnosis listed and that she requires {Admit to Appropriate Level of Care:87493} for {GREATER/LESS:103078360} 30 days.     Update Admission H&P: {CHP DME Changes in HandP:772185706}    PHYSICIAN SIGNATURE:  {Esignature:982974030}

## 2024-04-19 NOTE — PLAN OF CARE
Problem: ABCDS Injury Assessment  Goal: Absence of physical injury  4/18/2024 2156 by Dolores Gray, RN  Outcome: Completed  4/18/2024 1151 by Bertin Ortega, RN  Outcome: Progressing     Problem: Skin/Tissue Integrity  Goal: Absence of new skin breakdown  Description: 1.  Monitor for areas of redness and/or skin breakdown  2.  Assess vascular access sites hourly  3.  Every 4-6 hours minimum:  Change oxygen saturation probe site  4.  Every 4-6 hours:  If on nasal continuous positive airway pressure, respiratory therapy assess nares and determine need for appliance change or resting period.  4/18/2024 2156 by Dolores Gray, RN  Outcome: Completed  4/18/2024 1151 by Bertin Ortega, RN  Outcome: Progressing     Problem: Discharge Planning  Goal: Discharge to home or other facility with appropriate resources  Outcome: Completed  Flowsheets (Taken 4/18/2024 9237 by Bertin Ortega, RN)  Discharge to home or other facility with appropriate resources: Identify barriers to discharge with patient and caregiver     Problem: Safety - Adult  Goal: Free from fall injury  4/18/2024 2156 by Dolores Gray, RN  Outcome: Completed  4/18/2024 1151 by Bertin Ortega, RN  Outcome: Progressing     Problem: Nutrition Deficit:  Goal: Optimize nutritional status  Outcome: Completed     Problem: Pain  Goal: Verbalizes/displays adequate comfort level or baseline comfort level  Outcome: Completed

## 2024-04-20 LAB
MICROORGANISM SPEC CULT: NORMAL
SERVICE CMNT-IMP: NORMAL
SPECIMEN DESCRIPTION: NORMAL

## 2024-04-25 NOTE — PROGRESS NOTES
Physician Progress Note      PATIENT:               DAO COWART  CSN #:                  [id number]  :                       1965  ADMIT DATE:       4/15/2024 8:28 AM  DISCH DATE:        2024 9:57 PM  RESPONDING  PROVIDER #:        Karin Hopkins MD          QUERY TEXT:    Pt admitted with altered mental status and decreased oxygen saturations.  Pt   noted to have pneumonia. If possible, please document in the progress notes   and discharge summary if you are evaluating and/or treating any of the   following:      Note: CAP and HCAP indicate where the pneumonia was acquired, not a specific   type.    The medical record reflects the following:  Risk Factors: Chronic respiratory failure on oxygen  Clinical Indicators: H&P \"... imaging reveals pneumonia...AMS likely due to   pneumonia-Supplement O2 demands keeping oxygen saturation greater than   92%...\", ID Consult Note 4/15 \"...Possible pneumonia...chronic resp failure on   oxygen at facility...Cont IV vancomycin. Switched meropenem to cefepime   IV...\", ID note  \"...Possible pneumonia...Continue cefepime...No need for   vancomycin...\", Procalcitonin 0.20, Tmax 100.4, RR 37-16  Treatment: IV Cefepime, 1 dose IV vancomycin, labs, ID Consult, chest x-ray,   CTA Pulmonary, discharged on PO Levaquin      Thank you,  Niraj Olsen, BSN, RN, [facility]  [phone]  Options provided:  -- Probable Gram negative pneumonia  -- Other - I will add my own diagnosis  -- Disagree - Not applicable / Not valid  -- Disagree - Clinically unable to determine / Unknown  -- Refer to Clinical Documentation Reviewer    PROVIDER RESPONSE TEXT:    This patient has probable gram negative pneumonia.    Query created by: Niraj Olsen on 2024 8:34 AM      Electronically signed by:  Karin Hopkins MD 2024 5:47 AM

## 2024-05-03 RX ORDER — ASENAPINE 10 MG/1
TABLET SUBLINGUAL
Qty: 30 TABLET | Refills: 5 | Status: SHIPPED | OUTPATIENT
Start: 2024-05-03

## 2024-08-20 LAB
ALBUMIN: 3.2 GM/DL (ref 3.4–5)
ALP BLD-CCNC: 61 U/L (ref 46–116)
ALT SERPL-CCNC: 32 U/L (ref 5–49)
AST SERPL-CCNC: 32 IU/L (ref 0–34)
BASOPHILS ABSOLUTE: 0 10*3/UL (ref 0–0.1)
BASOPHILS RELATIVE PERCENT: 0.6 % (ref 0–1)
BILIRUB SERPL-MCNC: 0.2 MG/DL (ref 0.3–1.2)
BUN BLDV-MCNC: 16 MG/DL (ref 9–23)
CALCIUM SERPL-MCNC: 9.5 MD/DL (ref 8.7–10.4)
CHLORIDE BLD-SCNC: 105 MMOL/L (ref 98–107)
CHOLESTEROL, TOTAL: 273 MG/DL
CO2: 29 MMOL/L (ref 20–31)
CREAT SERPL-MCNC: 0.82 MG/DL (ref 0.55–1.02)
EOSINOPHILS ABSOLUTE: 0.2 10*3/UL (ref 0–0.4)
EOSINOPHILS RELATIVE PERCENT: 2.7 % (ref 1–4)
FOLATE: 17.69 NG/ML (ref 5.38–24)
GFR AFRICAN AMERICAN: > 60 ML/MIN
GFR, ESTIMATED: > 60 ML/MIN/
GLUCOSE: 94 MG/DL (ref 65–99)
HCT VFR BLD CALC: 40.3 % (ref 37–47)
HDLC SERPL-MCNC: 45 MG/DL (ref 40–60)
HEMOGLOBIN: 12.9 G/DL (ref 12–16)
IMMATURE GRANULOCYTES #: 0 10*3/UL (ref 0–0.1)
IMMATURE GRANULOCYTES %: 0.6 % (ref 0–1)
KEPPRA: 35.09 UG/ML (ref 6–46)
LDL CHOLESTEROL: 149 MG/DL (ref 9–159)
LYMPHOCYTES # BLD: 2.8 10*3/UL (ref 1.3–4.4)
LYMPHOCYTES RELATIVE PERCENT: 40.9 % (ref 27–41)
MCH RBC QN AUTO: 30.9 PG (ref 27–31)
MCHC RBC AUTO-ENTMCNC: 32 G/DL (ref 33–37)
MCV RBC AUTO: 96.6 FL (ref 81–99)
MONOCYTES RELATIVE PERCENT: 0.6 10*3/UL (ref 0.1–1)
MONOCYTES RELATIVE PERCENT: 8.1 % (ref 3–9)
NEUTROPHILS ABSOLUTE: 3.3 10*3/UL (ref 2.3–7.9)
NEUTROPHILS RELATIVE PERCENT: 47.1 % (ref 47–73)
NUCLEATED RED BLOOD CELLS: 0 % (ref 0–0)
PDW BLD-RTO: 14.8 % (ref 0–14.5)
PLATELET # BLD: 265 10*3/UL (ref 130–400)
PMV BLD AUTO: 9.9 FL (ref 9.6–12.3)
POTASSIUM SERPL-SCNC: 4.2 MMOL/L (ref 3.4–5.1)
RBC # BLD: 4.17 10*6/UL (ref 4.1–5.1)
SODIUM BLD-SCNC: 136 MMOL/L (ref 136–145)
T3 UPTAKE: 30 % (ref 22.4–36.7)
T7 FREE THYROXINE INDEX: 1.7 (ref 1.5–5.4)
THYROXINE (T4): 5.6 UG/DL (ref 4.5–10.9)
TOTAL PROTEIN: 6.8 GM/DL (ref 6–8)
TRIGL SERPL-MCNC: 395 MG/DL
TSH SERPL DL<=0.05 MIU/L-ACNC: 1.65 UIU/ML (ref 0.55–4.78)
URIC ACID: 3.6 MG/DL (ref 3.1–7.8)
VITAMIN B-12: 584 PG/ML (ref 211–911)
VITAMIN D 25-HYDROXY: 49.4 NG/ML (ref 30–100)
VLDLC SERPL CALC-MCNC: 79 MG/DL (ref 6–40)
WBC # BLD: 7 10*3/UL (ref 4.8–10.8)

## 2024-11-18 RX ORDER — ASENAPINE 10 MG/1
TABLET SUBLINGUAL
Qty: 30 TABLET | Refills: 11 | Status: SHIPPED | OUTPATIENT
Start: 2024-11-18

## 2024-11-18 NOTE — TELEPHONE ENCOUNTER
Name of Medication(s) Requested:  Requested Prescriptions     Pending Prescriptions Disp Refills    asenapine maleate (SAPHRIS) 10 MG SUBL sublingual tablet [Pharmacy Med Name: ASENAPINE MALEATE 10 MG SUB 10 Tablet] 30 tablet 11     Sig: DISSOLVE 1 TABLET UNDER THE TONGUE AT BED TIME       Medication is on current medication list Yes    Dosage and directions were verified? Yes    Quantity verified: 30 day supply     Pharmacy Verified?  Yes    Last Appointment:  Visit date not found    Future appts:  No future appointments.     (If no appt send self scheduling link. .REFILLAPPT)  Scheduling request sent?     [x] Yes  [] No    Does patient need updated?  [] Yes  [x] No

## 2024-11-19 LAB
ALBUMIN: 3.2 GM/DL (ref 3.4–5)
ALP BLD-CCNC: 60 U/L (ref 46–116)
ALT SERPL-CCNC: 28 U/L (ref 5–49)
AST SERPL-CCNC: 28 IU/L (ref 0–34)
BILIRUB SERPL-MCNC: 0.2 MG/DL (ref 0.3–1.2)
BUN BLDV-MCNC: 18 MG/DL (ref 9–23)
CALCIUM SERPL-MCNC: 9.2 MD/DL (ref 8.7–10.4)
CHLORIDE BLD-SCNC: 104 MMOL/L (ref 98–107)
CHOLESTEROL, TOTAL: 268 MG/DL
CO2: 29 MMOL/L (ref 20–31)
CREAT SERPL-MCNC: 0.68 MG/DL (ref 0.55–1.02)
GFR AFRICAN AMERICAN: > 60 ML/MIN
GFR, ESTIMATED: > 60 ML/MIN/
GLUCOSE: 94 MG/DL (ref 65–99)
HDLC SERPL-MCNC: 46 MG/DL (ref 40–60)
LDL CHOLESTEROL: 148 MG/DL (ref 9–159)
POTASSIUM SERPL-SCNC: 4.2 MMOL/L (ref 3.4–5.1)
SODIUM BLD-SCNC: 139 MMOL/L (ref 136–145)
TOTAL PROTEIN: 6.9 GM/DL (ref 6–8)
TRIGL SERPL-MCNC: 370 MG/DL
VLDLC SERPL CALC-MCNC: 74 MG/DL (ref 6–40)

## 2025-02-18 LAB
ALBUMIN: 2.9 GM/DL (ref 3.4–5)
ALP BLD-CCNC: 60 U/L (ref 46–116)
ALT SERPL-CCNC: 32 U/L (ref 5–49)
AST SERPL-CCNC: 33 IU/L (ref 0–34)
BASOPHILS ABSOLUTE: 0 10*3/UL (ref 0–0.1)
BASOPHILS RELATIVE PERCENT: 0.4 % (ref 0–1)
BILIRUB SERPL-MCNC: 0.2 MG/DL (ref 0.3–1.2)
BUN BLDV-MCNC: 17 MG/DL (ref 9–23)
CALCIUM SERPL-MCNC: 9.1 MD/DL (ref 8.7–10.4)
CHLORIDE BLD-SCNC: 104 MMOL/L (ref 98–107)
CHOLESTEROL, TOTAL: 254 MG/DL
CO2: 30 MMOL/L (ref 20–31)
CREAT SERPL-MCNC: 0.7 MG/DL (ref 0.55–1.02)
EOSINOPHILS ABSOLUTE: 0.2 10*3/UL (ref 0–0.4)
EOSINOPHILS RELATIVE PERCENT: 3.1 % (ref 1–4)
FOLATE: 23.23 NG/ML (ref 5.38–24)
GFR AFRICAN AMERICAN: > 60 ML/MIN
GFR, ESTIMATED: > 60 ML/MIN
GLUCOSE: 96 MG/DL (ref 65–99)
HCT VFR BLD CALC: 40.8 % (ref 37–47)
HDLC SERPL-MCNC: 42 MG/DL (ref 40–60)
HEMOGLOBIN: 12.5 G/DL (ref 12–16)
IMMATURE GRANULOCYTES #: 0.1 10*3/UL (ref 0–0.1)
IMMATURE GRANULOCYTES %: 1 % (ref 0–1)
KEPPRA: 37.88 UG/ML (ref 6–46)
LDL CHOLESTEROL: 138 MG/DL (ref 9–159)
LYMPHOCYTES ABSOLUTE: 2.9 10*3/UL (ref 1.3–4.4)
LYMPHOCYTES RELATIVE PERCENT: 38.3 % (ref 27–41)
MCH RBC QN AUTO: 30.2 PG (ref 27–31)
MCHC RBC AUTO-ENTMCNC: 30.6 G/DL (ref 33–37)
MCV RBC AUTO: 98.6 FL (ref 81–99)
MONOCYTES RELATIVE PERCENT: 0.6 10*3/UL (ref 0.1–1)
MONOCYTES RELATIVE PERCENT: 8.1 % (ref 3–9)
NEUTROPHILS ABSOLUTE: 3.8 10*3/UL (ref 2.3–7.9)
NEUTROPHILS RELATIVE PERCENT: 49.1 % (ref 47–73)
NUCLEATED RED BLOOD CELLS: 0 % (ref 0–0)
PDW BLD-RTO: 15 % (ref 0–14.5)
PLATELET # BLD: 289 10*3/UL (ref 130–400)
PMV BLD AUTO: 9.9 FL (ref 9.6–12.3)
POTASSIUM SERPL-SCNC: 4.3 MMOL/L (ref 3.4–5.1)
RBC # BLD: 4.14 10*6/UL (ref 4.1–5.1)
SODIUM BLD-SCNC: 139 MMOL/L (ref 136–145)
T3 UPTAKE: 30.9 % (ref 22.4–36.7)
T7 FREE THYROXINE INDEX: 1.5 (ref 1.5–5.4)
THYROXINE (T4): 4.9 UG/DL (ref 4.5–10.9)
TOTAL PROTEIN: 6.5 GM/DL (ref 6–8)
TRIGL SERPL-MCNC: 372 MG/DL
TSH SERPL DL<=0.05 MIU/L-ACNC: 1.17 UIU/ML (ref 0.55–4.78)
URIC ACID: 3.3 MG/DL (ref 3.1–7.8)
VITAMIN B-12: 455 PG/ML (ref 211–911)
VITAMIN D 25-HYDROXY: 60 NG/ML (ref 30–100)
VLDLC SERPL CALC-MCNC: 74 MG/DL (ref 6–40)
WBC # BLD: 7.7 10*3/UL (ref 4.8–10.8)

## 2025-03-06 RX ORDER — POLYETHYLENE GLYCOL 3350 17 G/17G
POWDER, FOR SOLUTION ORAL
Qty: 510 G | Refills: 5 | Status: SHIPPED | OUTPATIENT
Start: 2025-03-06

## 2025-04-08 RX ORDER — POLYETHYLENE GLYCOL 3350 17 G/17G
POWDER, FOR SOLUTION ORAL
Qty: 510 G | Refills: 6 | OUTPATIENT
Start: 2025-04-08

## 2025-04-29 RX ORDER — NYSTATIN 100000 [USP'U]/G
POWDER TOPICAL
Qty: 60 G | Refills: 12 | OUTPATIENT
Start: 2025-04-29

## 2025-04-29 RX ORDER — NYSTATIN AND TRIAMCINOLONE ACETONIDE 100000; 1 [USP'U]/G; MG/G
CREAM TOPICAL 4 TIMES DAILY
Qty: 2 EACH | Refills: 3 | Status: SHIPPED | OUTPATIENT
Start: 2025-04-29

## 2025-04-29 NOTE — TELEPHONE ENCOUNTER
Name of Medication(s) Requested:  Requested Prescriptions     Pending Prescriptions Disp Refills    nystatin-triamcinolone (MYCOLOG II) 154857-0.1 UNIT/GM-% cream 2 each 3     Sig: Apply topically 4 times daily Apply topically to ab 4 times daily.       Medication is on current medication list Yes    Dosage and directions were verified? Yes    Quantity verified: 30 day supply     Pharmacy Verified?  Yes    Last Appointment:  Visit date not found    Future appts:  No future appointments.     (If no appt send self scheduling link. .REFILLAPPT)  Scheduling request sent?     [] Yes  [x] No    Does patient need updated?  [] Yes  [x] No

## 2025-08-19 LAB
ALBUMIN: 3.1 GM/DL (ref 3.4–5)
ALP BLD-CCNC: 64 U/L (ref 46–116)
ALT SERPL-CCNC: 31 U/L (ref 5–49)
AST SERPL-CCNC: 35 IU/L (ref 0–34)
BASOPHILS ABSOLUTE: 0 10*3/UL (ref 0–0.1)
BASOPHILS RELATIVE PERCENT: 0.3 % (ref 0–1)
BILIRUB SERPL-MCNC: <0.2 MG/DL (ref 0.3–1.2)
BUN BLDV-MCNC: 16 MG/DL (ref 9–23)
CALCIUM SERPL-MCNC: 9.4 MD/DL (ref 8.7–10.4)
CHLORIDE BLD-SCNC: 105 MMOL/L (ref 98–107)
CHOLESTEROL, TOTAL: 203 MG/DL
CO2: 30 MMOL/L (ref 20–31)
CREAT SERPL-MCNC: 0.66 MG/DL (ref 0.55–1.02)
EOSINOPHILS ABSOLUTE: 0.2 10*3/UL (ref 0–0.4)
EOSINOPHILS RELATIVE PERCENT: 3.3 % (ref 1–4)
FOLATE: 13.82 NG/ML (ref 5.38–24)
GFR AFRICAN AMERICAN: > 60 ML/MIN
GFR, ESTIMATED: > 60 ML/MIN
GLUCOSE: 92 MG/DL (ref 65–99)
HCT VFR BLD CALC: 41.8 % (ref 37–47)
HDLC SERPL-MCNC: 38 MG/DL (ref 40–60)
HEMOGLOBIN: 12.8 G/DL (ref 12–16)
IMMATURE GRANULOCYTES #: 0.1 10*3/UL (ref 0–0.1)
IMMATURE GRANULOCYTES %: 1.3 % (ref 0–1)
KEPPRA: 46.99 UG/ML (ref 6–46)
LDL CHOLESTEROL: 103 MG/DL (ref 9–159)
LYMPHOCYTES ABSOLUTE: 2.8 10*3/UL (ref 1.3–4.4)
LYMPHOCYTES RELATIVE PERCENT: 41.2 % (ref 27–41)
MCH RBC QN AUTO: 30.2 PG (ref 27–31)
MCHC RBC AUTO-ENTMCNC: 30.6 G/DL (ref 33–37)
MCV RBC AUTO: 98.6 FL (ref 81–99)
MONOCYTES RELATIVE PERCENT: 0.6 10*3/UL (ref 0.1–1)
MONOCYTES RELATIVE PERCENT: 8 % (ref 3–9)
NEUTROPHILS ABSOLUTE: 3.2 10*3/UL (ref 2.3–7.9)
NEUTROPHILS RELATIVE PERCENT: 45.9 % (ref 47–73)
NUCLEATED RED BLOOD CELLS: 0 % (ref 0–0)
PDW BLD-RTO: 14.8 % (ref 0–14.5)
PLATELET # BLD: 279 10*3/UL (ref 130–400)
PMV BLD AUTO: 10.1 FL (ref 9.6–12.3)
POTASSIUM SERPL-SCNC: 4.3 MMOL/L (ref 3.4–5.1)
RBC # BLD: 4.24 10*6/UL (ref 4.1–5.1)
SODIUM BLD-SCNC: 140 MMOL/L (ref 136–145)
T3 UPTAKE: 28.2 % (ref 22.4–36.7)
T7 FREE THYROXINE INDEX: 1.6 (ref 1.5–5.4)
THYROXINE (T4): 5.8 UG/DL (ref 4.5–10.9)
TOTAL PROTEIN: 6.5 G/L (ref 5.7–8.2)
TRIGL SERPL-MCNC: 311 MG/DL
TSH SERPL DL<=0.05 MIU/L-ACNC: 1.59 UIU/ML (ref 0.55–4.78)
URIC ACID: 4 MG/DL (ref 3.1–7.8)
VITAMIN B-12: 509 PG/ML (ref 211–911)
VITAMIN D 25-HYDROXY: 69.8 NG/ML (ref 30–100)
VLDLC SERPL CALC-MCNC: 62 MG/DL (ref 6–40)
WBC # BLD: 6.9 10*3/UL (ref 4.8–10.8)

## (undated) DEVICE — GLOVE SURG SZ 65 THK91MIL LTX FREE SYN POLYISOPRENE

## (undated) DEVICE — GOWN,SIRUS,FABRNF,L,20/CS: Brand: MEDLINE

## (undated) DEVICE — DENTAL: Brand: MEDLINE INDUSTRIES, INC.

## (undated) DEVICE — GARMENT,MEDLINE,DVT,INT,CALF,MED, GEN2: Brand: MEDLINE

## (undated) DEVICE — SET PROPHY